# Patient Record
Sex: FEMALE | Race: WHITE | Employment: FULL TIME | URBAN - METROPOLITAN AREA
[De-identification: names, ages, dates, MRNs, and addresses within clinical notes are randomized per-mention and may not be internally consistent; named-entity substitution may affect disease eponyms.]

---

## 2018-08-21 PROBLEM — N39.0 RECURRENT UTI: Status: ACTIVE | Noted: 2018-08-21

## 2020-09-15 PROBLEM — N95.1 SYMPTOMATIC MENOPAUSAL OR FEMALE CLIMACTERIC STATES: Status: ACTIVE | Noted: 2020-09-15

## 2020-09-15 PROBLEM — I10 HIGH BLOOD PRESSURE: Status: ACTIVE | Noted: 2020-09-15

## 2020-09-15 PROBLEM — H69.80 DYSFUNCTION OF EUSTACHIAN TUBE: Status: ACTIVE | Noted: 2020-09-15

## 2020-09-15 PROBLEM — K21.9 ESOPHAGEAL REFLUX: Status: ACTIVE | Noted: 2020-09-15

## 2020-09-15 PROBLEM — E03.9 HYPOTHYROIDISM: Status: ACTIVE | Noted: 2020-09-15

## 2020-09-15 PROBLEM — J30.9 ALLERGIC RHINITIS: Status: ACTIVE | Noted: 2020-09-15

## 2020-09-15 PROBLEM — K58.9 IRRITABLE BOWEL SYNDROME: Status: ACTIVE | Noted: 2020-09-15

## 2021-01-21 ENCOUNTER — HOSPITAL ENCOUNTER (OUTPATIENT)
Dept: PHYSICAL THERAPY | Age: 61
Discharge: HOME | End: 2021-01-21
Payer: COMMERCIAL

## 2021-01-21 PROCEDURE — 97162 PT EVAL MOD COMPLEX 30 MIN: CPT

## 2021-01-21 PROCEDURE — 97110 THERAPEUTIC EXERCISES: CPT

## 2021-01-21 PROCEDURE — 97530 THERAPEUTIC ACTIVITIES: CPT

## 2021-01-21 NOTE — PROGRESS NOTES
[facility]  [address], [address] [facility] [place]  Ph [phone]  Fx [phone]    Plan of Care/ Statement of Necessity for Physical Therapy Services    Patient name: Paul Velarde Start of Care: 2021   Referral source: Zohra Li MD : 1960    Medical Diagnosis: Neck pain [M54.2]  Low back pain [M54.5]   Onset Date:chronic, progressive    Treatment Diagnosis: Neck pain M54.2; Low back pain M54.5                                               Prior Hospitalization: see medical history Provider#: [id number]   Medications: Verified on Patient summary List    Comorbidities: thyroid problems, asthma, HTN, cholecystectomy, elbow procedure, lymphedema of bilateral LEs; hysterectomy   Prior Level of Function: functionally independent, no AD, active lifestyle, right hand dominant      The Plan of Care and following information is based on the information from the initial evaluation. Assessment/ key information: 65 yo female who [facility] to [facility] with c/o neck and low back pain. Patient reports 6/10 neck and UE pain at best with finding good position and taking anti-inflammatories and use of ice and heat and bio freeze; 10/10 neck pain at worst with sleeping, typing at work; 7/10 low back pain at best with sitting; 10/10 low back pain at worst with any bending tendencies such as vacuuming, prolonged walking and standing, picking up things and moving things; pt reports they are unable to sleep through the night secondary to pain.  Patient demonstrates decreased ROM, decreased strength, impaired posture, pain and decreased functional mobility tolerance.   Patient will continue to benefit from skilled PT services to modify and progress therapeutic interventions, address functional mobility deficits, address ROM deficits, address strength deficits, analyze and address soft tissue restrictions, analyze and cue movement patterns, analyze and modify body mechanics/ergonomics and assess and modify postural abnormalities to attain remaining goals. Evaluation Complexity History MEDIUM  Complexity : 1-2 comorbidities / personal factors will impact the outcome/ POC ; Examination MEDIUM Complexity : 3 Standardized tests and measures addressing body structure, function, activity limitation and / or participation in recreation  ;Presentation MEDIUM Complexity : Evolving with changing characteristics  ; Clinical Decision Making MEDIUM Complexity : FOTO score of 26-74 : FOTO score = an established functional score where 100 = no disability  Overall Complexity Rating: MEDIUM  Problem List: pain affecting function, decrease ROM, decrease strength, edema affecting function, impaired gait/ balance, decrease ADL/ functional abilitiies, decrease activity tolerance, decrease flexibility/ joint mobility and decrease transfer abilities   Treatment Plan may include any combination of the following: Therapeutic exercise, Therapeutic activities, Neuromuscular re-education, Physical agent/modality, Gait/balance training, Manual therapy, Patient education, Self Care training, Functional mobility training, Home safety training and Stair training  Patient / Family readiness to learn indicated by: asking questions, trying to perform skills and interest  Persons(s) to be included in education: patient (P)  Barriers to Learning/Limitations: None  Measures taken if barriers to learning: NA  Patient Goal (s): I would like to get back to exercising like I used to and to relieve some of the pain. I want to be able to do everyday things that I used to be able to do without increased pain  Patient Self Reported Health Status: good  Rehabilitation Potential: good    Short Term Goals: To be accomplished in 2 weeks:  1. Patient will be independent and compliant with HEP to progress toward goals and restore functional mobility. Eval Status: issued at eval     2.  Patient will improve neck FOTO score by 10 points to improve functional tolerance for exercise. Eval Status: FOTO 48     3. Patient will improve pain in neck pain to 5/10 at worst to improve ADL tolerance and restore prior level of function. Eval Status: 10/10 at worst     4. Pt will have painfree cervical AROM to aid in functional mechanics for ambulation/ADLs. Eval Status:   Cervical Left Right Comments:pain, area   Flexion 12       Extension 15   P! Side bend 30 28 P! Rotation 35 40 P!                   Long Term Goals: To be accomplished in 6 weeks:  1. Patient will improve low back FOTO score by 12 points to improve functional tolerance for ADLs and exercise. Eval Status: FOTO 48     2. Pt will have painfree lumbar AROM to aid in functional mechanics for ambulation/ADLs. Eval Status:   Lumbar % AROM Comments:pain, area   Forward flexion 40-60 Fingertips to floor Stretch in HS   Extension 20-30 10% Pain in low back   SideBend right 20-30 53.5cm Pain on right side   SideBend left 20-30 53.5cm Pain on left side   Rotation right 5-10 10% Tight, restricted and increased pain on right   Rotation left 5-10 10% Tight, restricted and increased pain on left      3. Pt will have 5/5 bilateral UE and LE strength to return to goals of exercising and playing with grandkids without increased pain. Eval Status:   Shoulder L (0-5) R (0-5)   Shoulder Flexion 4+ 4+   Shoulder Extension 4+ 4+   Shoulder ABD 4+ 4+   Shoulder ADD       Shoulder IR 4+ 4+   Shoulder ER 4+ 4+                                             Cervical L (0-5) R (0-5)   Flexion 4-     Extension 4-     Side Bend 4- 4-   Rotation 4- 4-        L(0-5) R (0-5)   Hip Flexion (L1,2) 4 4   Knee Extension (L3,4) 4+ 4+   Ankle Dorsiflexion (L4) 4 4   Great Toe Extension (L5) 4+ 4+   Ankle Plantarflexion (S1) 4+ 4+   Knee Flexion (S1,2) 4 4   Abdominals 4-     Paraspinals 4-     Back Rotators 4- 4-   Gluteus Derrick 4- 4-   Hip Abduction 4- 4-      4.    Patient will improve pain in low back to 3/10 at worst to improve ADL tolerance and restore prior level of function. Eval Status: 10/10 at worst    Frequency / Duration: Patient to be seen 2 times per week for 6 weeks. Patient/ Caregiver education and instruction: Diagnosis, prognosis, self care, activity modification, brace/ splint application and exercises   [x]  Plan of care has been reviewed with PTA    Certification Period: FROILAN Yao, PT 1/21/2021 7:58 AM    ________________________________________________________________________    I certify that the above Therapy Services are being furnished while the patient is under my care. I agree with the treatment plan and certify that this therapy is necessary.     Physician's Signature:_____________________Date:____________TIME:________    [employer], Date and Time must be completed for valid certification **  Please sign and return to [facility]  [address], [facility]  Ph [phone]  Fx [phone]
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
## 2021-01-26 ENCOUNTER — HOSPITAL ENCOUNTER (OUTPATIENT)
Dept: PHYSICAL THERAPY | Age: 61
Discharge: HOME | End: 2021-01-26
Payer: COMMERCIAL

## 2021-01-26 PROCEDURE — 97140 MANUAL THERAPY 1/> REGIONS: CPT | Performed by: PHYSICAL THERAPIST

## 2021-01-26 PROCEDURE — 97110 THERAPEUTIC EXERCISES: CPT | Performed by: PHYSICAL THERAPIST

## 2021-01-26 NOTE — PROGRESS NOTES
PT DAILY TREATMENT NOTE    Patient Name: Tatum Marques  Date:2021  : 1960  [x]  Patient  Verified  Payor: Tosha Oiler / Plan: [place] [employer] PPO / Product Type: PPO /    In time:  Out time:162  Total Treatment Time (min):56  Total Timed Codes (min): 56    Visit #: 2 of 12    Treatment Area: Neck pain [M54.2]  Low back pain [M54.5]    SUBJECTIVE  Pain Level (0-10 scale): 8  Any medication changes, allergies to medications, adverse drug reactions, diagnosis change, or new procedure performed?: [x] No    [] Yes (see summary sheet for update)  Subjective functional status/changes:   [] No changes reported  Pt reports started HEP since last appt cervical retraction , open book     OBJECTIVE          41 min Therapeutic Exercise:  [x]? See flow sheet :   Rationale: increase ROM and increase strength to improve the patients ability to restore PLOF        15 min Manual Therapy:  Assisted ROM , supine retraction distraction extension mobilisation, MET , Mob with movement    Rationale: decrease pain, increase ROM and increase tissue extensibility to improve the patients functional mobility and ease of movement . The manual therapy interventions were performed at a separate and distinct time from the therapeutic activities interventions.             With   [] TE   [] TA   [] neuro   [] other: Patient Education: [x] Review HEP    [] Progressed/Changed HEP based on:   [] positioning   [] body mechanics   [] transfers   [] heat/ice application    [] other:      Other Objective/Functional Measures:   Cervical ROM : rotation right 38 deg , left 35 deg SB 22-23 deg bilateral , flexion 50% , extension 40 deg    Manual:   Assisted ROM , supine retraction distraction extension mobilisation, MET , Mob with movement      Pain Level (0-10 scale) post treatment: 8    ASSESSMENT/Changes in Function: tolerated ex well ,increase ROM in cervical with manual less pain in cervical thoracic junction but still pain high cervical tension and pulling , responded well to repeated mobility ex for UE trunk and LE ex improving ease and ROM . No adverse affect with therapy . Patient will continue to benefit from skilled PT services to modify and progress therapeutic interventions, address functional mobility deficits, address ROM deficits, address strength deficits, analyze and address soft tissue restrictions, analyze and cue movement patterns, analyze and modify body mechanics/ergonomics, assess and modify postural abnormalities and address imbalance/dizziness to attain remaining goals. [x]  See Plan of Care  []  See progress note/recertification  []  See Discharge Summary         Progress towards goals / Updated goals:  Short Term Goals: To be accomplished in 2 weeks:  1. Patient will be independent and compliant with HEP to progress toward goals and restore functional mobility. Eval Status: issued at [facility]  Current : issued another handout with new ex for repeated mobility ex .      2. Patient will improve neck FOTO score by 10 points to improve functional tolerance for exercise. Eval Status: FOTO 48     3. Patient will improve pain in neck pain to 5/10 at worst to improve ADL tolerance and restore prior level of function. Eval Status: 10/10 at worst  Current pain 8/10      4. Pt will have painfree cervical AROM to aid in functional mechanics for ambulation/ADLs. Eval Status:   Cervical Left Right Comments:pain, area   Flexion 12       Extension 15   P! Side bend 30 28 P! Rotation 35 40 P!                CUrrent : 1/26/21 rotation right 38 deg , left 35 deg SB 22-23 deg bilateral , flexion 50% , extension 40 deg       Long Term Goals: To be accomplished in 6 weeks:  1. Patient will improve low back FOTO score by 12 points to improve functional tolerance for ADLs and exercise. Eval Status: FOTO 48     2.   Pt will have painfree lumbar AROM to aid in functional mechanics for ambulation/ADLs.   Eval Status:   Lumbar % AROM Comments:pain, area   Forward flexion 40-60 Fingertips to floor Stretch in HS   Extension 20-30 10% Pain in low back   SideBend right 20-30 53.5cm Pain on right side   SideBend left 20-30 53.5cm Pain on left side   Rotation right 5-10 10% Tight, restricted and increased pain on right   Rotation left 5-10 10% Tight, restricted and increased pain on left      3.   Pt will have 5/5 bilateral UE and LE strength to return to goals of exercising and playing with grandkids without increased pain. Eval Status:   Shoulder L (0-5) R (0-5)   Shoulder Flexion 4+ 4+   Shoulder Extension 4+ 4+   Shoulder ABD 4+ 4+   Shoulder ADD       Shoulder IR 4+ 4+   Shoulder ER 4+ 4+                                             Cervical L (0-5) R (0-5)   Flexion 4-     Extension 4-     Side Bend 4- 4-   Rotation 4- 4-        L(0-5) R (0-5)   Hip Flexion (L1,2) 4 4   Knee Extension (L3,4) 4+ 4+   Ankle Dorsiflexion (L4) 4 4   Great Toe Extension (L5) 4+ 4+   Ankle Plantarflexion (S1) 4+ 4+   Knee Flexion (S1,2) 4 4   Abdominals 4-     Paraspinals 4-     Back Rotators 4- 4-   Gluteus Derrick 4- 4-   Hip Abduction 4- 4-      4.   Patient will improve pain in low back to 3/10 at worst to improve ADL tolerance and restore prior level of function.   Eval Status: 10/10 at worst      PLAN  [x]  Upgrade activities as tolerated     [x]  Continue plan of care  []  Update interventions per flow sheet       []  Discharge due to:_  []  Other:_      Maria Teresa Saleh, PT 1/26/2021  3:34 PM    Future Appointments   Date Time Provider Chris [place]   1/29/2021  3:30 PM Brianna العلي [facility]   2/2/2021  8:00 AM Nikhil Yao, PT [facility]   2/4/2021  8:00 AM Nikhil Yao PT [facility]   2/9/2021  8:00 AM Rani Becerra PTA [facility]   2/11/2021  8:00 AM Milka Jasmine [facility]   2/15/2021 10:15 AM Nikhil Yao, PT [facility]   2/18/2021  8:00 AM Rani Becerra PTA [facility]   2/23/2021  8:00 AM Rani Becerra PTA [facility]   2/25/2021  8:00 AM Elsa Ennis PT [facility]   3/15/2021  2:40 PM Kraig Baer[address]

## 2021-01-29 ENCOUNTER — HOSPITAL ENCOUNTER (OUTPATIENT)
Dept: PHYSICAL THERAPY | Age: 61
Discharge: HOME | End: 2021-01-29
Payer: COMMERCIAL

## 2021-01-29 PROCEDURE — 97140 MANUAL THERAPY 1/> REGIONS: CPT | Performed by: PHYSICAL THERAPIST

## 2021-01-29 PROCEDURE — 97110 THERAPEUTIC EXERCISES: CPT | Performed by: PHYSICAL THERAPIST

## 2021-01-29 NOTE — PROGRESS NOTES
PT DAILY TREATMENT NOTE    Patient Name: Suni Ma  Date:2021  : 1960  [x]  Patient  Verified  Payor: Allyn Montoya / Plan: [place] [employer] [place] / Product Type: PPO /    In time:1535 Out time:1640  Total Treatment Time (min): 65  Total Timed Codes (min): 65    Visit #: 3 of 12    Treatment Area: Neck pain [M54.2]  Low back pain [M54.5]    SUBJECTIVE  Pain Level (0-10 scale): 9/10 left base of skull and sx going up into left head, low back pain 9/10 left side , noticed mid morning left post thigh discomfort to knee . Currently no pain in thigh just left low back       Any medication changes, allergies to medications, adverse drug reactions, diagnosis change, or new procedure performed?: [x] No    [] Yes (see summary sheet for update)  Subjective functional status/changes:   [] No changes reported  Pt had one incident of rad sx down left post leg to knee not currently present , neck pain focus at base of skull C1-2     OBJECTIVE        50 min Therapeutic Exercise:  [x]? ? See flow sheet :   Rationale: increase ROM and increase strength to improve the patients ability to restore PLOF           15 min Manual Therapy:  PA mob C1-2 , MET for alignment/mobility High cervical taught HA mobs , assisted ROM , occipital release    Rationale: decrease pain, increase ROM and increase tissue extensibility to improve the patients functional mobility and ease of movement . The manual therapy interventions were performed at a separate and distinct time from the therapeutic activities interventions.             With   [] TE   [] TA   [] neuro   [] other: Patient Education: [x] Review HEP    [] Progressed/Changed HEP based on:   [] positioning   [] body mechanics   [] transfers   [] heat/ice application    [] other:      Other Objective/Functional Measures:   PA mob C1-2 , MET for alignment/mobility High cervical taught HA mobs , assisted ROM , occipital release      Cervical rotation limited today 19-20 deg bilateral, SB also 19 -20 deg , flexion 50% , extension 40 deg     Pain Level (0-10 scale) post treatment: 5     ASSESSMENT/Changes in Function: pt responded well to manual tech to lessen HA and pain base skull , mild improved cervical rotation, noted pt in standing mild shifted upper trunk to left side , responded well to standing glide ex to right to correct shift , less pain with glide and with pressups , however discomfort with standing extension . No adverse affect with therapy     Patient will continue to benefit from skilled PT services to modify and progress therapeutic interventions, address functional mobility deficits, address ROM deficits, address strength deficits, analyze and address soft tissue restrictions, analyze and cue movement patterns, analyze and modify body mechanics/ergonomics and assess and modify postural abnormalities to attain remaining goals. [x]  See Plan of Care  []  See progress note/recertification  []  See Discharge Summary         Progress towards goals / Updated goals:  Short Term Goals: To be accomplished in 2 weeks:  1. Patient will be independent and compliant with HEP to progress toward goals and restore functional mobility. Eval Status: issued at [facility]  Current : issued another handout with new ex for repeated mobility ex . clarified ex for weekend pressups , unilateral pressups , glides , cervical retraction , HA mobs      2. Patient will improve neck FOTO score by 10 points to improve functional tolerance for exercise. Eval Status: FOTO 48     3. Patient will improve pain in neck pain to 5/10 at worst to improve ADL tolerance and restore prior level of function. Eval Status: 10/10 at worst  Current pain 9/10 per pt decreased to 5 end of session      4. Pt will have painfree cervical AROM to aid in functional mechanics for ambulation/ADLs. Eval Status:   Cervical Left Right Comments:pain, area   Flexion 12       Extension 15   P! Side bend 30 28 P!    Rotation 35 40 P!                CUrrent : 1/26/21 rotation right 38 deg , left 35 deg SB 22-23 deg bilateral , flexion 50% , extension 40 deg  1/29/2021 more limited rotation today 19-20 deg bilaterally         Long Term Goals: To be accomplished in 6 weeks:  1. Patient will improve low back FOTO score by 12 points to improve functional tolerance for ADLs and exercise. Eval Status: FOTO 48     2.   Pt will have painfree lumbar AROM to aid in functional mechanics for ambulation/ADLs. Eval Status:   Lumbar % AROM Comments:pain, area   Forward flexion 40-60 Fingertips to floor Stretch in HS   Extension 20-30 10% Pain in low back   SideBend right 20-30 53.5cm Pain on right side   SideBend left 20-30 53.5cm Pain on left side   Rotation right 5-10 10% Tight, restricted and increased pain on right   Rotation left 5-10 10% Tight, restricted and increased pain on left      3.   Pt will have 5/5 bilateral UE and LE strength to return to goals of exercising and playing with grandkids without increased pain. Eval Status:   Shoulder L (0-5) R (0-5)   Shoulder Flexion 4+ 4+   Shoulder Extension 4+ 4+   Shoulder ABD 4+ 4+   Shoulder ADD       Shoulder IR 4+ 4+   Shoulder ER 4+ 4+                                             Cervical L (0-5) R (0-5)   Flexion 4-     Extension 4-     Side Bend 4- 4-   Rotation 4- 4-        L(0-5) R (0-5)   Hip Flexion (L1,2) 4 4   Knee Extension (L3,4) 4+ 4+   Ankle Dorsiflexion (L4) 4 4   Great Toe Extension (L5) 4+ 4+   Ankle Plantarflexion (S1) 4+ 4+   Knee Flexion (S1,2) 4 4   Abdominals 4-     Paraspinals 4-     Back Rotators 4- 4-   Gluteus Derrick 4- 4-   Hip Abduction 4- 4-      4.   Patient will improve pain in low back to 3/10 at worst to improve ADL tolerance and restore prior level of function.   Eval Status: 10/10 at worst         PLAN  [x]  Upgrade activities as tolerated     [x]  Continue plan of care  []  Update interventions per flow sheet       []  Discharge due to:_  []  Other:_      Alondra Allen PT 1/29/2021  3:57 PM    Future Appointments   Date Time Provider Chris [place]mine   2/2/2021  2:00 PM Alverto Tapia [facility]   2/4/2021  2:00 PM Gali Yao, [facility]   2/9/2021  3:30 PM [facility]   2/11/2021  3:30 PM [facility]   2/15/2021 10:15 AM Gali Yao, [facility]   2/18/2021  8:00 AM Lucita Pizano [facility]   2/23/2021  2:00 PM [facility]   2/25/2021  3:30 PM [facility]   3/15/2021  2:40 PM Negrito Licea[address]

## 2021-02-02 ENCOUNTER — HOSPITAL ENCOUNTER (OUTPATIENT)
Dept: PHYSICAL THERAPY | Age: 61
Discharge: HOME | End: 2021-02-02
Payer: COMMERCIAL

## 2021-02-02 PROCEDURE — 97140 MANUAL THERAPY 1/> REGIONS: CPT | Performed by: PHYSICAL THERAPIST

## 2021-02-02 PROCEDURE — 97110 THERAPEUTIC EXERCISES: CPT | Performed by: PHYSICAL THERAPIST

## 2021-02-02 NOTE — PROGRESS NOTES
PT DAILY TREATMENT NOTE    Patient Name: Suni Ma  [id number]  : 1960  [x]  Patient  Verified  Payor: Allyn Montoya / Plan: [place] [employer] [place] / Product Type: PPO /    In time:1400  Out time:1456  Total Treatment Time (min): 56  Total Timed Codes (min): 56  1:1 Treatment Time ( only): 64   Visit #: 4 of 12    Treatment Area: Neck pain [M54.2]  Low back pain [M54.5]    SUBJECTIVE  Pain Level (0-10 scale): 6 high cervical and low back   Any medication changes, allergies to medications, adverse drug reactions, diagnosis change, or new procedure performed?: [x] No    [] Yes (see summary sheet for update)  Subjective functional status/changes:   [] No changes reported  Sore Saturday am , just stayed sore this weekend  More in neck , but low back no leg symptoms when she left after appt Friday and has not returned just pain in back no rad sx . OBJECTIVE        46 min Therapeutic Exercise:  [x]? ?? See flow sheet :   Rationale: increase ROM and increase strength to improve the patients ability to restore PLOF           10 min Manual Therapy:  occipital release , assisted ROM , stretch SLR    Rationale: decrease pain, increase ROM and increase tissue extensibility to improve the patients functional mobility and ease of movement .   The manual therapy interventions were performed at a separate and distinct time from the therapeutic activities interventions.           With   [] TE   [] TA   [] neuro   [] other: Patient Education: [x] Review HEP    [] Progressed/Changed HEP based on:   [] positioning   [] body mechanics   [] transfers   [] heat/ice application    [] other:      Other Objective/Functional Measures:   Pelvic alignment : = ASIS , = leg , right up PSIS , quad flexibility only mild tight L>R added stretch and mild right hip flexor tight     pressup 50% today initial but increase to 75%+ end of session  , sitting rotation tight 40 deg each way ( increase to 55 deg right , 43 deg left following UE repeated flexion ex )     UE flexion 165 left , 180 deg right following 3x 10 reps right left now 175-180 deg     abd WNL approx 175 deg bilaterally     Cervical rotation 37 deg right ( 45 end of session )  , 42 deg left ( 48 end of session )   24 sidebend right ( increase to 32 deg ) , 22 right SB ( increase to 27 )    extension 40 to 44 deg      Pain Level (0-10 scale) post treatment: 6 same     ASSESSMENT/Changes in Function: pt low back progressing in less pain no rad sx and increase mobility. Pt high cervical pain persisting responding slowly , note some improvement in ROM with cervical from beginning to end of session but pain is staying high. No adverse affect with therapy . Pt tolerated added resistive ex today. Patient will continue to benefit from skilled PT services to modify and progress therapeutic interventions, address functional mobility deficits, address ROM deficits, address strength deficits, analyze and address soft tissue restrictions, analyze and cue movement patterns, analyze and modify body mechanics/ergonomics and assess and modify postural abnormalities to attain remaining goals. [x]  See Plan of Care  []  See progress note/recertification  []  See Discharge Summary         Progress towards goals / Updated goals:  Short Term Goals: To be accomplished in 2 weeks:  1. Patient will be independent and compliant with HEP to progress toward goals and restore functional mobility. Eval Status: issued at eval  Current : 2/2/2021 reviewed ex giving cues for posture and positioning .      2. Patient will improve neck FOTO score by 10 points to improve functional tolerance for exercise. Eval Status: FOTO 48     3. Patient will improve pain in neck pain to 5/10 at worst to improve ADL tolerance and restore prior level of function. Eval Status: 10/10 at worst  Current pain staying 6 today 2/2/2021     4.  Pt will have painfree cervical AROM to aid in functional mechanics for ambulation/ADLs. Eval Status:   Cervical Left Right Comments:pain, area   Flexion 12       Extension 15   P! Side bend 30 28 P! Rotation 35 40 P!                 1/26/21 rotation right 38 deg , left 35 deg SB 22-23 deg bilateral , flexion 50% , extension 40 deg  1/29/2021 more limited rotation today 19-20 deg bilaterally     2/2/2021 Cervical rotation 37 deg right ( 45 end of session )  , 42 deg left ( 48 end of session )   24 sidebend right ( increase to 32 deg ) , 22 right SB ( increase to 27 )    extension 40 to 44 deg         Long Term Goals: To be accomplished in 6 weeks:  1. Patient will improve low back FOTO score by 12 points to improve functional tolerance for ADLs and exercise. Eval Status: FOTO 48     2.   Pt will have painfree lumbar AROM to aid in functional mechanics for ambulation/ADLs. Eval Status:   Lumbar % AROM Comments:pain, area   Forward flexion 40-60 Fingertips to floor Stretch in HS   Extension 20-30 10% Pain in low back   SideBend right 20-30 53.5cm Pain on right side   SideBend left 20-30 53.5cm Pain on left side   Rotation right 5-10 10% Tight, restricted and increased pain on right   Rotation left 5-10 10% Tight, restricted and increased pain on left      3.   Pt will have 5/5 bilateral UE and LE strength to return to goals of exercising and playing with grandkids without increased pain.   Eval Status:   Shoulder L (0-5) R (0-5)   Shoulder Flexion 4+ 4+   Shoulder Extension 4+ 4+   Shoulder ABD 4+ 4+   Shoulder ADD       Shoulder IR 4+ 4+   Shoulder ER 4+ 4+                                             Cervical L (0-5) R (0-5)   Flexion 4-     Extension 4-     Side Bend 4- 4-   Rotation 4- 4-        L(0-5) R (0-5)   Hip Flexion (L1,2) 4 4   Knee Extension (L3,4) 4+ 4+   Ankle Dorsiflexion (L4) 4 4   Great Toe Extension (L5) 4+ 4+   Ankle Plantarflexion (S1) 4+ 4+   Knee Flexion (S1,2) 4 4   Abdominals 4-     Paraspinals 4-     Back Rotators 4- 4-   Gluteus Derrick 4- 4-   Hip Abduction 4- 4-      4.   Patient will improve pain in low back to 3/10 at worst to improve ADL tolerance and restore prior level of function.   Eval Status: 10/10 at worst         PLAN  [x]  Upgrade activities as tolerated     [x]  Continue plan of care  []  Update interventions per flow sheet       []  Discharge due to:_  []  Other:_      Ondina Camejo, PT 2/2/2021  2:25 PM    Future Appointments   Date Time Provider Chris Dover   2/4/2021  2:00 PM Annie Perales, [facility]   2/9/2021  3:30 PM [facility]   2/11/2021  3:30 PM [facility]   2/15/2021 10:15 AM Juany Yao [facility]   2/18/2021  8:00 AM Mulu Collins [facility]   2/23/2021  2:00 PM Yeyo [facility]   2/25/2021  3:30 PM [facility]   3/15/2021  2:40 PM Kulwinder Gold, [address] [place] [address]

## 2021-02-04 ENCOUNTER — HOSPITAL ENCOUNTER (OUTPATIENT)
Dept: PHYSICAL THERAPY | Age: 61
Discharge: HOME | End: 2021-02-04
Payer: COMMERCIAL

## 2021-02-04 PROCEDURE — 97140 MANUAL THERAPY 1/> REGIONS: CPT

## 2021-02-04 PROCEDURE — 97110 THERAPEUTIC EXERCISES: CPT

## 2021-02-04 NOTE — PROGRESS NOTES
PT DAILY TREATMENT NOTE    Patient Name: Angelica Landers  LBXQ:[id number]  : 1960  [x]  Patient  Verified  Payor: [facility] / Plan: [place] [employer] PPO / Product Type: PPO /    In time:1400  Out time:1445  Total Treatment Time (min): 45  Total Timed Codes (min): 45  1:1 Treatment Time ( only): 45   Visit #: 5 of 12    Treatment Area: Neck pain [M54.2]  Low back pain [M54.5]    SUBJECTIVE  Pain Level (0-10 scale): 7/10 neck and back pain  Any medication changes, allergies to medications, adverse drug reactions, diagnosis change, or new procedure performed?: [x] No    [] Yes (see summary sheet for update)  Subjective functional status/changes:   [] No changes reported  Patient reports no new changes. She does report that she has not had any change in her pain since the [facility]. OBJECTIVE    30 min Therapeutic Exercise:  [] See flow sheet :   Rationale: increase ROM and increase strength to improve the patients ability to restore PLOF    15 min Manual Therapy:  SOR, MFR And TPR to bilateral UT and LS   Rationale: decrease pain, increase ROM and increase tissue extensibility to improve the patients ability to restore PLOF  The manual therapy interventions were performed at a separate and distinct time from the therapeutic activities interventions. With   [x] TE   [] TA   [] neuro   [] other: Patient Education: [x] Review HEP    [] Progressed/Changed HEP based on:   [] positioning   [] body mechanics   [] transfers   [] heat/ice application    [] other:      Other Objective/Functional Measures: NA     Pain Level (0-10 scale) post treatment: 4/10    ASSESSMENT/Changes in Function: Patient tolerated treatment session well today. No complaints with added core stability exercises.     Patient will continue to benefit from skilled PT services to modify and progress therapeutic interventions, address functional mobility deficits, address ROM deficits, address strength deficits, analyze and address soft tissue restrictions, analyze and cue movement patterns, analyze and modify body mechanics/ergonomics and assess and modify postural abnormalities to attain remaining goals. [x]  See Plan of Care  []  See progress note/recertification  []  See Discharge Summary         Progress towards goals / Updated goals:  Short Term Goals: To be accomplished in 2 weeks:  1. Patient will be independent and compliant with HEP to progress toward goals and restore functional mobility. Eval Status: issued at eval  Current : 2/2/2021 reviewed ex giving cues for posture and positioning .      2. Patient will improve neck FOTO score by 10 points to improve functional tolerance for exercise. Eval Status: FOTO 48     3. Patient will improve pain in neck pain to 5/10 at worst to improve ADL tolerance and restore prior level of function. Eval Status: 10/10 at worst  Current: 4-7/10 today 2/4/21     4. Pt will have painfree cervical AROM to aid in functional mechanics for ambulation/ADLs. Eval Status:   Cervical Left Right Comments:pain, area   Flexion 12       Extension 15   P! Side bend 30 28 P! Rotation 35 40 P!                 1/26/21 rotation right 38 deg , left 35 deg SB 22-23 deg bilateral , flexion 50% , extension 40 deg  1/29/2021 more limited rotation today 19-20 deg bilaterally      2/2/2021 Cervical rotation 37 deg right ( 45 end of session )  , 42 deg left ( 48 end of session )   24 sidebend right ( increase to 32 deg ) , 22 right SB ( increase to 27 )    extension 40 to 44 deg         Long Term Goals: To be accomplished in 6 weeks:  1. Patient will improve low back FOTO score by 12 points to improve functional tolerance for ADLs and exercise. Eval Status: FOTO 48     2.   Pt will have painfree lumbar AROM to aid in functional mechanics for ambulation/ADLs.   Eval Status:   Lumbar % AROM Comments:pain, area   Forward flexion 40-60 Fingertips to floor Stretch in HS   Extension 20-30 10% Pain in low back   SideBend right 20-30 53.5cm Pain on right side   SideBend left 20-30 53.5cm Pain on left side   Rotation right 5-10 10% Tight, restricted and increased pain on right   Rotation left 5-10 10% Tight, restricted and increased pain on left      3.   Pt will have 5/5 bilateral UE and LE strength to return to goals of exercising and playing with grandkids without increased pain. Eval Status:   Shoulder L (0-5) R (0-5)   Shoulder Flexion 4+ 4+   Shoulder Extension 4+ 4+   Shoulder ABD 4+ 4+   Shoulder ADD       Shoulder IR 4+ 4+   Shoulder ER 4+ 4+                                             Cervical L (0-5) R (0-5)   Flexion 4-     Extension 4-     Side Bend 4- 4-   Rotation 4- 4-        L(0-5) R (0-5)   Hip Flexion (L1,2) 4 4   Knee Extension (L3,4) 4+ 4+   Ankle Dorsiflexion (L4) 4 4   Great Toe Extension (L5) 4+ 4+   Ankle Plantarflexion (S1) 4+ 4+   Knee Flexion (S1,2) 4 4   Abdominals 4-     Paraspinals 4-     Back Rotators 4- 4-   Gluteus Derrick 4- 4-   Hip Abduction 4- 4-      4.   Patient will improve pain in low back to 3/10 at worst to improve ADL tolerance and restore prior level of function.   Eval Status: 10/10 at worst         PLAN  []  Upgrade activities as tolerated     []  Continue plan of care  []  Update interventions per flow sheet       []  Discharge due to:_  []  Other:_      Abelardo Ybarra, PT 2/4/2021  2:17 PM    Future Appointments   Date Time Provider Chris [place]   2/9/2021  3:30 PM [facility]   2/11/2021  3:30 PM [facility]   2/15/2021 10:15 AM Ashley Yao, [facility]   2/18/2021  8:00 AM Susie Vaughan PT [facility]   2/23/2021  2:00 PM Michael Steiner [facility]   2/25/2021  3:30 PM [facility]   3/15/2021  2:40 PM Evin Arora, [address] [place] [address]

## 2021-02-08 ENCOUNTER — HOSPITAL ENCOUNTER (OUTPATIENT)
Dept: PHYSICAL THERAPY | Age: 61
Discharge: HOME | End: 2021-02-08
Payer: COMMERCIAL

## 2021-02-08 PROCEDURE — 97140 MANUAL THERAPY 1/> REGIONS: CPT

## 2021-02-08 PROCEDURE — 97110 THERAPEUTIC EXERCISES: CPT

## 2021-02-08 NOTE — PROGRESS NOTES
PT DAILY TREATMENT NOTE    Patient Name: Abel Gómez  Date:2021  : 1960  [x]  Patient  Verified  Payor: Vena Duane / Plan: [place] [employer] [place] / Product Type: PPO /    In time:161  Out time:170  Total Treatment Time (min): 45  Total Timed Codes (min): 45  1:1 Treatment Time ( [address] only): 45   Visit #: 6 of 12    Treatment Area: Neck pain [M54.2]  Low back pain [M54.5]    SUBJECTIVE  Pain Level (0-10 scale): 7/10 neck and back  Any medication changes, allergies to medications, adverse drug reactions, diagnosis change, or new procedure performed?: [x] No    [] Yes (see summary sheet for update)  Subjective functional status/changes:   [] No changes reported  Patient pleasant, reports she felt like she had improved ROM following last visit and had carryover through Saturday night. OBJECTIVE    25 min Therapeutic Exercise:  [] See flow sheet :   Rationale: increase ROM and increase strength to improve the patients ability to restore PLOF    20 min Manual Therapy:  SOR, MFR And TPR to bilateral UT and LS   Rationale: decrease pain, increase ROM and increase tissue extensibility to improve the patients ability to restore PLOF  The manual therapy interventions were performed at a separate and distinct time from the therapeutic activities interventions. With   [x] TE   [] TA   [] neuro   [] other: Patient Education: [x] Review HEP    [] Progressed/Changed HEP based on:   [] positioning   [] body mechanics   [] transfers   [] heat/ice application    [] other:      Other Objective/Functional Measures: NA     Pain Level (0-10 scale) post treatment: 5/10    ASSESSMENT/Changes in Function: Pt tolerated treatment session well today. Noted decreased muscle tension with TPR to right UT today, however left UT still largely stiff and tight causing restricted ROM on right side. No complaints or adverse reactions noted with session today.     Patient will continue to benefit from skilled PT services to modify and progress therapeutic interventions, address functional mobility deficits, address ROM deficits, address strength deficits, analyze and address soft tissue restrictions, analyze and cue movement patterns, analyze and modify body mechanics/ergonomics and assess and modify postural abnormalities to attain remaining goals. [x]  See Plan of Care  []  See progress note/recertification  []  See Discharge Summary         Progress towards goals / Updated goals:  Short Term Goals: To be accomplished in 2 weeks:  1. Patient will be independent and compliant with HEP to progress toward goals and restore functional mobility. Eval Status: issued at eval  Current : 2/2/2021 reviewed ex giving cues for posture and positioning .      2. Patient will improve neck FOTO score by 10 points to improve functional tolerance for exercise. Eval Status: FOTO 48     3. Patient will improve pain in neck pain to 5/10 at worst to improve ADL tolerance and restore prior level of function. Eval Status: 10/10 at worst  Current: 4-7/10 today 2/4/21     4. Pt will have painfree cervical AROM to aid in functional mechanics for ambulation/ADLs. Eval Status:   Cervical Left Right Comments:pain, area   Flexion 12       Extension 15   P! Side bend 30 28 P! Rotation 35 40 P!                 1/26/21 rotation right 38 deg , left 35 deg SB 22-23 deg bilateral , flexion 50% , extension 40 deg  1/29/2021 more limited rotation today 19-20 deg bilaterally      2/2/2021 Cervical rotation 37 deg right ( 45 end of session )  , 42 deg left ( 48 end of session )   24 sidebend right ( increase to 32 deg ) , 22 right SB ( increase to 27 )    extension 40 to 44 deg         Long Term Goals: To be accomplished in 6 weeks:  1. Patient will improve low back FOTO score by 12 points to improve functional tolerance for ADLs and exercise. Eval Status: FOTO 48     2.   Pt will have painfree lumbar AROM to aid in functional mechanics for ambulation/ADLs.   Eval Status:   Lumbar % AROM Comments:pain, area   Forward flexion 40-60 Fingertips to floor Stretch in HS   Extension 20-30 10% Pain in low back   SideBend right 20-30 53.5cm Pain on right side   SideBend left 20-30 53.5cm Pain on left side   Rotation right 5-10 10% Tight, restricted and increased pain on right   Rotation left 5-10 10% Tight, restricted and increased pain on left      3.   Pt will have 5/5 bilateral UE and LE strength to return to goals of exercising and playing with grandkids without increased pain. Eval Status:   Shoulder L (0-5) R (0-5)   Shoulder Flexion 4+ 4+   Shoulder Extension 4+ 4+   Shoulder ABD 4+ 4+   Shoulder ADD       Shoulder IR 4+ 4+   Shoulder ER 4+ 4+                                             Cervical L (0-5) R (0-5)   Flexion 4-     Extension 4-     Side Bend 4- 4-   Rotation 4- 4-        L(0-5) R (0-5)   Hip Flexion (L1,2) 4 4   Knee Extension (L3,4) 4+ 4+   Ankle Dorsiflexion (L4) 4 4   Great Toe Extension (L5) 4+ 4+   Ankle Plantarflexion (S1) 4+ 4+   Knee Flexion (S1,2) 4 4   Abdominals 4-     Paraspinals 4-     Back Rotators 4- 4-   Gluteus Derrick 4- 4-   Hip Abduction 4- 4-      4.   Patient will improve pain in low back to 3/10 at worst to improve ADL tolerance and restore prior level of function.   Eval Status: 10/10 at worst  Current: 5-7/10 today 2/8/21         PLAN  []  Upgrade activities as tolerated     []  Continue plan of care  []  Update interventions per flow sheet       []  Discharge due to:_  []  Other:_      Aneesh Islas, PT 2/8/2021  4:23 PM    Future Appointments   Date Time Provider Chris Dover   2/10/2021  4:15 PM Alise Ryan [facility]   2/15/2021 10:15 AM Briana Yao, PT [facility]   2/18/2021  8:45 AM Briana Yao PT [facility]   2/22/2021  4:15 PM Alise Ryan [facility]   2/24/2021  4:15 PM Edinson Cook PT [facility]   3/15/2021  2:40 PM Clement Dougherty, [address] [place] [address]

## 2021-02-09 ENCOUNTER — APPOINTMENT (OUTPATIENT)
Dept: PHYSICAL THERAPY | Age: 61
End: 2021-02-09
Payer: COMMERCIAL

## 2021-02-10 ENCOUNTER — HOSPITAL ENCOUNTER (OUTPATIENT)
Dept: PHYSICAL THERAPY | Age: 61
Discharge: HOME | End: 2021-02-10
Payer: COMMERCIAL

## 2021-02-10 PROCEDURE — 97110 THERAPEUTIC EXERCISES: CPT

## 2021-02-10 PROCEDURE — 97140 MANUAL THERAPY 1/> REGIONS: CPT

## 2021-02-10 PROCEDURE — 97530 THERAPEUTIC ACTIVITIES: CPT

## 2021-02-10 NOTE — PROGRESS NOTES
PT DAILY TREATMENT NOTE    Patient Name: Nona Ganser  Date:2/10/2021  : 1960  [x]  Patient  Verified  Payor: Tatianna Moore / [place]: [place] [employer] [place] / Product Type: PPO /    In time:1610  Out time:1700  Total Treatment Time (min): 50  Total Timed Codes (min): 50  1:1 Treatment Time ( only): 50   Visit #: 7 of 12    Treatment Area: Neck pain [M54.2]  Low back pain [M54.5]    SUBJECTIVE  Pain Level (0-10 scale): 5/10 neck, 4/10 low back  Any medication changes, allergies to medications, adverse drug reactions, diagnosis change, or new procedure performed?: [x] No    [] Yes (see summary sheet for update)  Subjective functional status/changes:   [] No changes reported  Patient pleasant, reports no new changes. OBJECTIVE  25 min Therapeutic Exercise:  [] See flow sheet :   Rationale: increase ROM and increase strength to improve the patients ability to restore PLOF    25 min Manual Therapy:  SOR, MFR And TPR to bilateral UT and LS and rhomboids   Rationale: decrease pain, increase ROM and increase tissue extensibility to improve the patients ability to restore PLOF  The manual therapy interventions were performed at a separate and distinct time from the therapeutic activities interventions. With   [x] TE   [] TA   [] neuro   [] other: Patient Education: [x] Review HEP    [] Progressed/Changed HEP based on:   [] positioning   [] body mechanics   [] transfers   [] heat/ice application    [] other:      Other Objective/Functional Measures: NA     Pain Level (0-10 scale) post treatment: 0    ASSESSMENT/Changes in Function: Patient tolerated treatment session well today. She continues to make good progress toward goals. No adverse reaction with session today.     Patient will continue to benefit from skilled PT services to modify and progress therapeutic interventions, address functional mobility deficits, address ROM deficits, address strength deficits, analyze and address soft tissue restrictions, analyze and cue movement patterns, analyze and modify body mechanics/ergonomics and assess and modify postural abnormalities to attain remaining goals. [x]  See Plan of Care  []  See progress note/recertification  []  See Discharge Summary         Progress towards goals / Updated goals:  Short Term Goals: To be accomplished in 2 weeks:  1. Patient will be independent and compliant with HEP to progress toward goals and restore functional mobility. Eval Status: issued at eval  Current : 2/2/2021 reviewed ex giving cues for posture and positioning .      2. Patient will improve neck FOTO score by 10 points to improve functional tolerance for exercise. Eval Status: FOTO 48     3. Patient will improve pain in neck pain to 5/10 at worst to improve ADL tolerance and restore prior level of function. Eval Status: 10/10 at worst  Current: 3-5/10 today improving 2/10/21     4. Pt will have painfree cervical AROM to aid in functional mechanics for ambulation/ADLs. Eval Status:   Cervical Left Right Comments:pain, area   Flexion 12       Extension 15   P! Side bend 30 28 P! Rotation 35 40 P!                 1/26/21 rotation right 38 deg , left 35 deg SB 22-23 deg bilateral , flexion 50% , extension 40 deg  1/29/2021 more limited rotation today 19-20 deg bilaterally      2/2/2021 Cervical rotation 37 deg right ( 45 end of session )  , 42 deg left ( 48 end of session )   24 sidebend right ( increase to 32 deg ) , 22 right SB ( increase to 27 )    extension 40 to 44 deg         Long Term Goals: To be accomplished in 6 weeks:  1. Patient will improve low back FOTO score by 12 points to improve functional tolerance for ADLs and exercise. Eval Status: FOTO 48     2.   Pt will have painfree lumbar AROM to aid in functional mechanics for ambulation/ADLs.   Eval Status:   Lumbar % AROM Comments:pain, area   Forward flexion 40-60 Fingertips to floor Stretch in HS   Extension 20-30 10% Pain in low back   SideBend right 20-30 53.5cm Pain on right side   SideBend left 20-30 53.5cm Pain on left side   Rotation right 5-10 10% Tight, restricted and increased pain on right   Rotation left 5-10 10% Tight, restricted and increased pain on left      3.   Pt will have 5/5 bilateral UE and LE strength to return to goals of exercising and playing with grandkids without increased pain. Eval Status:   Shoulder L (0-5) R (0-5)   Shoulder Flexion 4+ 4+   Shoulder Extension 4+ 4+   Shoulder ABD 4+ 4+   Shoulder ADD       Shoulder IR 4+ 4+   Shoulder ER 4+ 4+                                             Cervical L (0-5) R (0-5)   Flexion 4-     Extension 4-     Side Bend 4- 4-   Rotation 4- 4-        L(0-5) R (0-5)   Hip Flexion (L1,2) 4 4   Knee Extension (L3,4) 4+ 4+   Ankle Dorsiflexion (L4) 4 4   Great Toe Extension (L5) 4+ 4+   Ankle Plantarflexion (S1) 4+ 4+   Knee Flexion (S1,2) 4 4   Abdominals 4-     Paraspinals 4-     Back Rotators 4- 4-   Gluteus Derrick 4- 4-   Hip Abduction 4- 4-      4.   Patient will improve pain in low back to 3/10 at worst to improve ADL tolerance and restore prior level of function.   Eval Status: 10/10 at worst  Current: 5-7/10 today 2/8/21      PLAN  [x]  Upgrade activities as tolerated     [x]  Continue plan of care  []  Update interventions per flow sheet       []  Discharge due to:_  []  Other:_      Ferd Brittle, PT 2/10/2021  4:22 PM    Future Appointments   Date Time Provider Chris Dover   2/15/2021 10:15 AM Brent Kearney [facility]   2/18/2021  8:45 AM Crystal Yao [facility]   2/22/2021  4:15 PM Crystal Yao [facility]   2/24/2021  4:15 PM Brent Kearney [facility]   3/15/2021  2:40 PM Keven Fuentes[address]

## 2021-02-11 ENCOUNTER — APPOINTMENT (OUTPATIENT)
Dept: PHYSICAL THERAPY | Age: 61
End: 2021-02-11
Payer: COMMERCIAL

## 2021-02-15 ENCOUNTER — HOSPITAL ENCOUNTER (OUTPATIENT)
Dept: PHYSICAL THERAPY | Age: 61
Discharge: HOME | End: 2021-02-15
Payer: COMMERCIAL

## 2021-02-15 PROCEDURE — 97140 MANUAL THERAPY 1/> REGIONS: CPT

## 2021-02-15 PROCEDURE — 97110 THERAPEUTIC EXERCISES: CPT

## 2021-02-15 NOTE — PROGRESS NOTES
PT DAILY TREATMENT NOTE    Patient Name: Arlette Gustafson  Date:2/15/2021  : 1960  [x]  Patient  Verified  Payor: Patricia Neff / [place]: [place] [employer] [place] / Product Type: PPO /    In time:1015  Out time:1110  Total Treatment Time (min): 55  Total Timed Codes (min): 55  1:1 Treatment Time ( only): 54   Visit #: 8 of 12    Treatment Area: Neck pain [M54.2]  Low back pain [M54.5]    SUBJECTIVE  Pain Level (0-10 scale): 6/10 neck and back pain  Any medication changes, allergies to medications, adverse drug reactions, diagnosis change, or new procedure performed?: [x] No    [] Yes (see summary sheet for update)  Subjective functional status/changes:   [] No changes reported  Pt pleasant, reports no new changes. She reports she thinks the pain is higher bc of the lousy weather. OBJECTIVE    30 min Therapeutic Exercise:  [] See flow sheet :   Rationale: increase ROM and increase strength to improve the patients ability to restore PLOF    25 min Manual Therapy:  SOR, MFR And TPR to bilateral UT and LS and rhomboids   Rationale: decrease pain, increase ROM and increase tissue extensibility to improve the patients ability to restore PLOF  The manual therapy interventions were performed at a separate and distinct time from the therapeutic activities interventions. With   [x] TE   [] TA   [] neuro   [] other: Patient Education: [x] Review HEP    [] Progressed/Changed HEP based on:   [] positioning   [] body mechanics   [] transfers   [] heat/ice application    [] other:      Other Objective/Functional Measures: improved ROM, improved pain levels, no clinically important change in strength     Pain Level (0-10 scale) post treatment: 5/10 neck and pain     ASSESSMENT/Changes in Function: Pt tolerated treatment session well today. No complaints with added therex for core stability and strength today. She is making slow progress toward goals.  She would benefit from continued skilled PT intervention to address remaining goals. Patient will continue to benefit from skilled PT services to modify and progress therapeutic interventions, address functional mobility deficits, address ROM deficits, address strength deficits, analyze and address soft tissue restrictions, analyze and cue movement patterns, analyze and modify body mechanics/ergonomics and assess and modify postural abnormalities to attain remaining goals. [x]  See Plan of Care  []  See progress note/recertification  []  See Discharge Summary         Progress towards goals / Updated goals:  Short Term Goals: To be accomplished in 2 weeks:  1. Patient will be independent and compliant with HEP to progress toward goals and restore functional mobility. Eval Status: issued at eval  Current : 2/15/2021 reviewed ex giving cues for posture and positioning .      2. Patient will improve neck FOTO score by 10 points to improve functional tolerance for exercise. Eval Status: FOTO 48  Current: FOTO 52, progressing 2/15/21     3. Patient will improve pain in neck pain to 5/10 at worst to improve ADL tolerance and restore prior level of function. Eval Status: 10/10 at worst  Current: 5-6/10 today improving 2/15/21     4. Pt will have painfree cervical AROM to aid in functional mechanics for ambulation/ADLs. Eval Status:   Cervical Left Right Comments:pain, area   Flexion 12       Extension 15   P! Side bend 30 28 P! Rotation 35 40 P!                Current: 2/15/2021 Cervical rotation 40 deg right  , 45 deg left  30 sidebend right  , 30 right SB    extension 35deg  Flexion 30 deg          Long Term Goals: To be accomplished in 6 weeks:  1. Patient will improve low back FOTO score by 12 points to improve functional tolerance for ADLs and exercise.   Eval Status: FOTO 48  Current: FOTO 39, regression although she reports functional improvement and decreased low back pain 2/15/21     2.   Pt will have painfree lumbar AROM to aid in functional mechanics for ambulation/ADLs. Eval Status:   Lumbar % AROM Comments:pain, area   Forward flexion 40-60 Fingertips to floor Stretch in HS   Extension 20-30 10% Pain in low back   SideBend right 20-30 53.5cm Pain on right side   SideBend left 20-30 53.5cm Pain on left side   Rotation right 5-10 10% Tight, restricted and increased pain on right   Rotation left 5-10 10% Tight, restricted and increased pain on left    Current: no significant change except for rotation improved to 25% bilaterally without increased pain 2/15/21    3.   Pt will have 5/5 bilateral UE and LE strength to return to goals of exercising and playing with grandkids without increased pain. Eval Status:   Shoulder L (0-5) R (0-5)   Shoulder Flexion 4+ 4+   Shoulder Extension 4+ 4+   Shoulder ABD 4+ 4+   Shoulder ADD       Shoulder IR 4+ 4+   Shoulder ER 4+ 4+                                             Cervical L (0-5) R (0-5)   Flexion 4-     Extension 4-     Side Bend 4- 4-   Rotation 4- 4-        L(0-5) R (0-5)   Hip Flexion (L1,2) 4 4   Knee Extension (L3,4) 4+ 4+   Ankle Dorsiflexion (L4) 4 4   Great Toe Extension (L5) 4+ 4+   Ankle Plantarflexion (S1) 4+ 4+   Knee Flexion (S1,2) 4 4   Abdominals 4-     Paraspinals 4-     Back Rotators 4- 4-   Gluteus Derrick 4- 4-   Hip Abduction 4- 4-   Current: no significant change since initial evaluation 2/15/21     4.   Patient will improve pain in low back to 3/10 at worst to improve ADL tolerance and restore prior level of function.   Eval Status: 10/10 at worst  Current: 5-6/10 today 2/15/21         PLAN  [x]  Upgrade activities as tolerated     [x]  Continue plan of care  []  Update interventions per flow sheet       []  Discharge due to:_  []  Other:_      Carline Kolb, PT 2/15/2021  10:19 AM    Future Appointments   Date Time Provider Chris Dover   2/18/2021  8:45 AM Kenan Abdullahi, PT [facility]   2/22/2021  4:15 PM Ceci Yao, PT [facility]   2/24/2021  4:15 PM Kenan Abdullahi, PT [facility] 3/15/2021  2:40 PM Gideon Rodriguez, [address] [place] [address]

## 2021-02-15 NOTE — PROGRESS NOTES
[facility]  [address], [address] [facility]  Ph [phone]  Fx [phone]    Physical Therapy Progress Note  Patient name: Cathryn Mesa Start of Care: 2021   Referral source: Perico Kruger MD : 1960                Medical Diagnosis: Neck pain [M54.2]  Low back pain [M54.5]    Onset Date:chronic, progressive                Treatment Diagnosis: Neck pain M54.2; Low back pain M54.5                                               Prior Hospitalization: see medical history Provider#: [id number]   Medications: Verified on Patient summary List    Comorbidities: thyroid problems, asthma, HTN, cholecystectomy, elbow procedure, lymphedema of bilateral LEs; hysterectomy   Prior Level of Function: functionally independent, no AD, active lifestyle, right hand dominant      Visits from Start of Care: 8    Missed Visits: 0    Goals/Measure of Progress:    Short Term Goals: To be accomplished in 2 weeks:  1. Patient will be independent and compliant with HEP to progress toward goals and restore functional mobility. Eval Status: issued at eval  Current : 2/15/2021 reviewed ex giving cues for posture and positioning .      2. Patient will improve neck FOTO score by 10 points to improve functional tolerance for exercise. Eval Status: FOTO 48  Current: FOTO 52, progressing 2/15/21     3. Patient will improve pain in neck pain to 5/10 at worst to improve ADL tolerance and restore prior level of function. Eval Status: 10/10 at worst  Current: 5-6/10 today improving 2/15/21     4. Pt will have painfree cervical AROM to aid in functional mechanics for ambulation/ADLs. Eval Status:   Cervical Left Right Comments:pain, area   Flexion 12       Extension 15   P! Side bend 30 28 P!    Rotation 35 40 P!                Current: 2/15/2021 Cervical rotation 40 deg right  , 45 deg left  30 sidebend right  , 30 right SB    extension 35deg  Flexion 30 deg          Long Term Goals: To be accomplished in 6 weeks:  1. Patient will improve low back FOTO score by 12 points to improve functional tolerance for ADLs and exercise. Eval Status: FOTO 48  Current: FOTO 39, regression although she reports functional improvement and decreased low back pain 2/15/21     2.   Pt will have painfree lumbar AROM to aid in functional mechanics for ambulation/ADLs. Eval Status:   Lumbar % AROM Comments:pain, area   Forward flexion 40-60 Fingertips to floor Stretch in HS   Extension 20-30 10% Pain in low back   SideBend right 20-30 53.5cm Pain on right side   SideBend left 20-30 53.5cm Pain on left side   Rotation right 5-10 10% Tight, restricted and increased pain on right   Rotation left 5-10 10% Tight, restricted and increased pain on left    Current: no significant change except for rotation improved to 25% bilaterally without increased pain 2/15/21     3.   Pt will have 5/5 bilateral UE and LE strength to return to goals of exercising and playing with grandkids without increased pain. Eval Status:   Shoulder L (0-5) R (0-5)   Shoulder Flexion 4+ 4+   Shoulder Extension 4+ 4+   Shoulder ABD 4+ 4+   Shoulder ADD       Shoulder IR 4+ 4+   Shoulder ER 4+ 4+                                             Cervical L (0-5) R (0-5)   Flexion 4-     Extension 4-     Side Bend 4- 4-   Rotation 4- 4-        L(0-5) R (0-5)   Hip Flexion (L1,2) 4 4   Knee Extension (L3,4) 4+ 4+   Ankle Dorsiflexion (L4) 4 4   Great Toe Extension (L5) 4+ 4+   Ankle Plantarflexion (S1) 4+ 4+   Knee Flexion (S1,2) 4 4   Abdominals 4-     Paraspinals 4-     Back Rotators 4- 4-   Gluteus Derrick 4- 4-   Hip Abduction 4- 4-   Current: no significant change since initial evaluation 2/15/21      4.   Patient will improve pain in low back to 3/10 at worst to improve ADL tolerance and restore prior level of function. Eval Status: 10/10 at worst  Current: 5-6/10 today 2/15/21            Key Functional Changes: Pt tolerated treatment session well today.  No complaints with added therex for core stability and strength today. She is making slow progress toward goals. She would benefit from continued skilled PT intervention to address remaining goals    Updated Goals:  To be achieved in 4 weeks:        ASSESSMENT/RECOMMENDATIONS:  [x]Continue therapy per initial plan/protocol at a frequency of  2 x per week for 4 weeks  []Continue therapy with the following recommended changes:_____________________ _____________________________ ________________________________________  []Discontinue therapy progressing towards or have reached established goals  []Discontinue therapy due to lack of appreciable progress towards goals  []Discontinue therapy due to lack of attendance or compliance  []Await Physician's recommendations/decisions regarding therapy  []Other:________________________________________________________________    Thank you for this referral.   Markos Yao, PT 2/15/2021 11:23 AM

## 2021-02-16 ENCOUNTER — APPOINTMENT (OUTPATIENT)
Dept: PHYSICAL THERAPY | Age: 61
End: 2021-02-16
Payer: COMMERCIAL

## 2021-02-18 ENCOUNTER — APPOINTMENT (OUTPATIENT)
Dept: PHYSICAL THERAPY | Age: 61
End: 2021-02-18
Payer: COMMERCIAL

## 2021-02-22 ENCOUNTER — APPOINTMENT (OUTPATIENT)
Dept: PHYSICAL THERAPY | Age: 61
End: 2021-02-22
Payer: COMMERCIAL

## 2021-02-24 ENCOUNTER — APPOINTMENT (OUTPATIENT)
Dept: PHYSICAL THERAPY | Age: 61
End: 2021-02-24
Payer: COMMERCIAL

## 2021-04-13 ENCOUNTER — HOSPITAL ENCOUNTER (OUTPATIENT)
Dept: PREADMISSION TESTING | Age: 61
Discharge: HOME | End: 2021-04-13
Payer: COMMERCIAL

## 2021-04-13 ENCOUNTER — HOSPITAL ENCOUNTER (OUTPATIENT)
Dept: LAB | Age: 61
Discharge: HOME | End: 2021-04-13

## 2021-04-13 LAB
ATRIAL RATE: 104 BPM
CALCULATED P AXIS, ECG09: 73 DEGREES
CALCULATED R AXIS, ECG10: 60 DEGREES
CALCULATED T AXIS, ECG11: 77 DEGREES
DIAGNOSIS, 93000: NORMAL
P-R INTERVAL, ECG05: 154 MS
Q-T INTERVAL, ECG07: 342 MS
QRS DURATION, ECG06: 84 MS
QTC CALCULATION (BEZET), ECG08: 449 MS
SENTARA SPECIMEN COL,SENBCF: NORMAL
VENTRICULAR RATE, ECG03: 104 BPM

## 2021-04-13 PROCEDURE — 93005 ELECTROCARDIOGRAM TRACING: CPT

## 2021-04-13 PROCEDURE — 99001 SPECIMEN HANDLING PT-LAB: CPT

## 2021-04-16 NOTE — PROGRESS NOTES
[facility]  [address] Willie Marcum, [address] [facility] [place]  Ph [phone]  Fx [phone]    Physical Therapy Discharge Summary    Patient name: Mitzi Garcia Start of Care: 1/21/2021   Referral Misti Andrews MD BYF: 9/38/2879                Medical Diagnosis: Neck pain [M54.2]  Low back pain [M54.5]    Onset Date:chronic, progressive                Treatment Diagnosis: Neck pain M54.2; Low back pain M54.5                                               Prior Hospitalization: see medical history Provider#: [id number]   Medications: Verified on Patient summary List    Comorbidities: thyroid problems, asthma, HTN, cholecystectomy, elbow procedure, lymphedema of bilateral LEs; hysterectomy   Prior Level of Function: functionally independent, no AD, active lifestyle, right hand dominant      Visits from Start of Care: 8    Missed Visits: 0    Reporting Period : 1/21/21 to 4/16/21    Summary of Care:    Unable to formally assess goals as pt failed to show for scheduled followup appts. Please see below for goals assessment while pt was current under the care of this clinic. Please DC to [facility]P at this time. Thank you for this referral. Pt will require new order if he/she requires further PT services. Short Term Goals: To be accomplished in 2 weeks:  1. Patient will be independent and compliant with HEP to progress toward goals and restore functional mobility. Eval Status: issued at eval  Current : 2/15/2021 reviewed ex giving cues for posture and positioning .      2. Patient will improve neck FOTO score by 10 points to improve functional tolerance for exercise. Eval Status: FOTO 48  Current: FOTO 52, progressing 2/15/21     3. Patient will improve pain in neck pain to 5/10 at worst to improve ADL tolerance and restore prior level of function. Eval Status: 10/10 at worst  Current: 5-6/10 today improving 2/15/21     4.  Pt will have painfree cervical AROM to aid in functional mechanics for ambulation/ADLs. Eval Status:   Cervical Left Right Comments:pain, area   Flexion 12       Extension 15   P! Side bend 30 28 P! Rotation 35 40 P!                Current: 2/15/2021 Cervical rotation 40 deg right  , 45 deg left  30 sidebend right  , 30 right SB    extension 35deg  Flexion 30 deg          Long Term Goals: To be accomplished in 6 weeks:  1. Patient will improve low back FOTO score by 12 points to improve functional tolerance for ADLs and exercise. Eval Status: FOTO 48  Current: FOTO 39, regression although she reports functional improvement and decreased low back pain 2/15/21     2.   Pt will have painfree lumbar AROM to aid in functional mechanics for ambulation/ADLs. Eval Status:   Lumbar % AROM Comments:pain, area   Forward flexion 40-60 Fingertips to floor Stretch in HS   Extension 20-30 10% Pain in low back   SideBend right 20-30 53.5cm Pain on right side   SideBend left 20-30 53.5cm Pain on left side   Rotation right 5-10 10% Tight, restricted and increased pain on right   Rotation left 5-10 10% Tight, restricted and increased pain on left    Current: no significant change except for rotation improved to 25% bilaterally without increased pain 2/15/21     3.   Pt will have 5/5 bilateral UE and LE strength to return to goals of exercising and playing with grandkids without increased pain.   Eval Status:   Shoulder L (0-5) R (0-5)   Shoulder Flexion 4+ 4+   Shoulder Extension 4+ 4+   Shoulder ABD 4+ 4+   Shoulder ADD       Shoulder IR 4+ 4+   Shoulder ER 4+ 4+                                             Cervical L (0-5) R (0-5)   Flexion 4-     Extension 4-     Side Bend 4- 4-   Rotation 4- 4-        L(0-5) R (0-5)   Hip Flexion (L1,2) 4 4   Knee Extension (L3,4) 4+ 4+   Ankle Dorsiflexion (L4) 4 4   Great Toe Extension (L5) 4+ 4+   Ankle Plantarflexion (S1) 4+ 4+   Knee Flexion (S1,2) 4 4   Abdominals 4-     Paraspinals 4-     Back Rotators 4- 4-   Gluteus Derrick 4- 4-   Hip Abduction 4- 4-   Current: no significant change since initial evaluation 2/15/21      4.   Patient will improve pain in low back to 3/10 at worst to improve ADL tolerance and restore prior level of function.   Eval Status: 10/10 at worst  Current: 5-6/10 today 2/15/21    ASSESSMENT/RECOMMENDATIONS:  [x]Discontinue therapy: []Patient has reached or is progressing toward set goals      [x]Patient is non-compliant or has abdicated      []Due to lack of appreciable progress towards set goals    Ashley Yao, PT 4/16/2021 12:29 PM

## 2021-04-27 ENCOUNTER — HOSPITAL ENCOUNTER (OUTPATIENT)
Dept: PREADMISSION TESTING | Age: 61
Discharge: HOME | End: 2021-04-27
Payer: COMMERCIAL

## 2021-04-27 PROCEDURE — U0003 INFECTIOUS AGENT DETECTION BY NUCLEIC ACID (DNA OR RNA); SEVERE ACUTE RESPIRATORY SYNDROME CORONAVIRUS 2 (SARS-COV-2) (CORONAVIRUS DISEASE [COVID-19]), AMPLIFIED PROBE TECHNIQUE, MAKING USE OF HIGH THROUGHPUT TECHNOLOGIES AS DESCRIBED BY CMS-2020-01-R: HCPCS

## 2021-04-28 LAB — SARS-COV-2, COV2NT: NOT DETECTED

## 2021-05-03 ENCOUNTER — ANESTHESIA EVENT (OUTPATIENT)
Dept: SURGERY | Age: 61
End: 2021-05-03
Payer: COMMERCIAL

## 2021-05-03 ENCOUNTER — ANESTHESIA (OUTPATIENT)
Dept: SURGERY | Age: 61
End: 2021-05-03
Payer: COMMERCIAL

## 2021-05-03 ENCOUNTER — HOSPITAL ENCOUNTER (OUTPATIENT)
Age: 61
Setting detail: OUTPATIENT SURGERY
Discharge: HOME | End: 2021-05-03
Attending: ORTHOPAEDIC SURGERY | Admitting: ORTHOPAEDIC SURGERY
Payer: COMMERCIAL

## 2021-05-03 ENCOUNTER — APPOINTMENT (OUTPATIENT)
Dept: GENERAL RADIOLOGY | Age: 61
End: 2021-05-03
Attending: ORTHOPAEDIC SURGERY
Payer: COMMERCIAL

## 2021-05-03 VITALS
WEIGHT: 169.56 LBS | DIASTOLIC BLOOD PRESSURE: 80 MMHG | OXYGEN SATURATION: 97 % | TEMPERATURE: 97.2 F | RESPIRATION RATE: 14 BRPM | HEART RATE: 85 BPM | SYSTOLIC BLOOD PRESSURE: 158 MMHG | HEIGHT: 66 IN | BODY MASS INDEX: 27.25 KG/M2

## 2021-05-03 DIAGNOSIS — M48.02 CERVICAL SPINAL STENOSIS: Primary | ICD-10-CM

## 2021-05-03 PROBLEM — M47.812 CERVICAL SPONDYLOSIS: Status: ACTIVE | Noted: 2021-05-03

## 2021-05-03 PROBLEM — M54.10 RADICULOPATHY OF ARM: Status: ACTIVE | Noted: 2021-05-03

## 2021-05-03 LAB
ABO + RH BLD: NORMAL
BLOOD GROUP ANTIBODIES SERPL: NORMAL
SPECIMEN EXP DATE BLD: NORMAL

## 2021-05-03 PROCEDURE — 77030018390 HC SPNG HEMSTAT2 J&J -B: Performed by: ORTHOPAEDIC SURGERY

## 2021-05-03 PROCEDURE — 77030020782 HC GWN BAIR PAWS FLX 3M -B: Performed by: ORTHOPAEDIC SURGERY

## 2021-05-03 PROCEDURE — 74011250636 HC RX REV CODE- 250/636: Performed by: NURSE ANESTHETIST, CERTIFIED REGISTERED

## 2021-05-03 PROCEDURE — 36415 COLL VENOUS BLD VENIPUNCTURE: CPT

## 2021-05-03 PROCEDURE — 74011000250 HC RX REV CODE- 250: Performed by: ORTHOPAEDIC SURGERY

## 2021-05-03 PROCEDURE — 74011250636 HC RX REV CODE- 250/636: Performed by: ORTHOPAEDIC SURGERY

## 2021-05-03 PROCEDURE — 76010000153 HC OR TIME 1.5 TO 2 HR: Performed by: ORTHOPAEDIC SURGERY

## 2021-05-03 PROCEDURE — C1713 ANCHOR/SCREW BN/BN,TIS/BN: HCPCS | Performed by: ORTHOPAEDIC SURGERY

## 2021-05-03 PROCEDURE — 77030008683 HC TU ET CUF COVD -A: Performed by: ANESTHESIOLOGY

## 2021-05-03 PROCEDURE — 77030003028 HC SUT VCRL J&J -A: Performed by: ORTHOPAEDIC SURGERY

## 2021-05-03 PROCEDURE — 77030006643: Performed by: ANESTHESIOLOGY

## 2021-05-03 PROCEDURE — 77030010507 HC ADH SKN DERMBND J&J -B: Performed by: ORTHOPAEDIC SURGERY

## 2021-05-03 PROCEDURE — 74011000250 HC RX REV CODE- 250: Performed by: NURSE ANESTHETIST, CERTIFIED REGISTERED

## 2021-05-03 PROCEDURE — 76210000016 HC OR PH I REC 1 TO 1.5 HR: Performed by: ORTHOPAEDIC SURGERY

## 2021-05-03 PROCEDURE — 86901 BLOOD TYPING SEROLOGIC RH(D): CPT

## 2021-05-03 PROCEDURE — 74011250636 HC RX REV CODE- 250/636: Performed by: ANESTHESIOLOGY

## 2021-05-03 PROCEDURE — 77030008477 HC STYL SATN SLP COVD -A: Performed by: ANESTHESIOLOGY

## 2021-05-03 PROCEDURE — 2709999900 HC NON-CHARGEABLE SUPPLY: Performed by: ORTHOPAEDIC SURGERY

## 2021-05-03 PROCEDURE — 76210000021 HC REC RM PH II 0.5 TO 1 HR: Performed by: ORTHOPAEDIC SURGERY

## 2021-05-03 PROCEDURE — 76060000034 HC ANESTHESIA 1.5 TO 2 HR: Performed by: ORTHOPAEDIC SURGERY

## 2021-05-03 PROCEDURE — 74011000250 HC RX REV CODE- 250: Performed by: ANESTHESIOLOGY

## 2021-05-03 PROCEDURE — C1889 IMPLANT/INSERT DEVICE, NOC: HCPCS | Performed by: ORTHOPAEDIC SURGERY

## 2021-05-03 PROCEDURE — 77030033138 HC SUT PGA STRATFX J&J -B: Performed by: ORTHOPAEDIC SURGERY

## 2021-05-03 PROCEDURE — 77030020268 HC MISC GENERAL SUPPLY: Performed by: ORTHOPAEDIC SURGERY

## 2021-05-03 PROCEDURE — 77030040361 HC SLV COMPR DVT MDII -B: Performed by: ORTHOPAEDIC SURGERY

## 2021-05-03 DEVICE — I-FACTOR PUTTY, 1.0CC SYRINGE
Type: IMPLANTABLE DEVICE | Site: SPINE CERVICAL | Status: FUNCTIONAL
Brand: I-FACTOR PEPTIDE ENHANCED BONE GRAFT

## 2021-05-03 DEVICE — IMPLANTABLE DEVICE
Type: IMPLANTABLE DEVICE | Site: SPINE CERVICAL | Status: FUNCTIONAL
Brand: STALIF C-TI

## 2021-05-03 DEVICE — IMPLANTABLE DEVICE
Type: IMPLANTABLE DEVICE | Site: SPINE CERVICAL | Status: FUNCTIONAL
Brand: STALIF C

## 2021-05-03 RX ORDER — PROCHLORPERAZINE EDISYLATE 5 MG/ML
5 INJECTION INTRAMUSCULAR; INTRAVENOUS
Status: DISCONTINUED | OUTPATIENT
Start: 2021-05-03 | End: 2021-05-03 | Stop reason: HOSPADM

## 2021-05-03 RX ORDER — CEPHALEXIN 500 MG/1
500 CAPSULE ORAL 4 TIMES DAILY
Qty: 28 CAP | Refills: 0 | Status: SHIPPED | OUTPATIENT
Start: 2021-05-03 | End: 2021-05-10

## 2021-05-03 RX ORDER — SODIUM CHLORIDE, SODIUM LACTATE, POTASSIUM CHLORIDE, CALCIUM CHLORIDE 600; 310; 30; 20 MG/100ML; MG/100ML; MG/100ML; MG/100ML
125 INJECTION, SOLUTION INTRAVENOUS CONTINUOUS
Status: DISCONTINUED | OUTPATIENT
Start: 2021-05-03 | End: 2021-05-03 | Stop reason: HOSPADM

## 2021-05-03 RX ORDER — HYDROCODONE BITARTRATE AND ACETAMINOPHEN 5; 325 MG/1; MG/1
1 TABLET ORAL
Status: DISCONTINUED | OUTPATIENT
Start: 2021-05-03 | End: 2021-05-03 | Stop reason: HOSPADM

## 2021-05-03 RX ORDER — FLUMAZENIL 0.1 MG/ML
0.2 INJECTION INTRAVENOUS
Status: DISCONTINUED | OUTPATIENT
Start: 2021-05-03 | End: 2021-05-03 | Stop reason: HOSPADM

## 2021-05-03 RX ORDER — ACETAMINOPHEN 500 MG
1000 TABLET ORAL
COMMUNITY
End: 2021-05-03

## 2021-05-03 RX ORDER — HYDROMORPHONE HYDROCHLORIDE 1 MG/ML
0.5 INJECTION, SOLUTION INTRAMUSCULAR; INTRAVENOUS; SUBCUTANEOUS
Status: DISCONTINUED | OUTPATIENT
Start: 2021-05-03 | End: 2021-05-03 | Stop reason: HOSPADM

## 2021-05-03 RX ORDER — SODIUM CHLORIDE 0.9 % (FLUSH) 0.9 %
5-40 SYRINGE (ML) INJECTION AS NEEDED
Status: DISCONTINUED | OUTPATIENT
Start: 2021-05-03 | End: 2021-05-03 | Stop reason: HOSPADM

## 2021-05-03 RX ORDER — ROCURONIUM BROMIDE 10 MG/ML
INJECTION, SOLUTION INTRAVENOUS AS NEEDED
Status: DISCONTINUED | OUTPATIENT
Start: 2021-05-03 | End: 2021-05-03 | Stop reason: HOSPADM

## 2021-05-03 RX ORDER — METHOCARBAMOL 750 MG/1
750 TABLET, FILM COATED ORAL 4 TIMES DAILY
Qty: 60 TAB | Refills: 0 | Status: SHIPPED
Start: 2021-05-03 | End: 2021-07-16

## 2021-05-03 RX ORDER — DIPHENHYDRAMINE HYDROCHLORIDE 50 MG/ML
12.5 INJECTION, SOLUTION INTRAMUSCULAR; INTRAVENOUS
Status: DISCONTINUED | OUTPATIENT
Start: 2021-05-03 | End: 2021-05-03 | Stop reason: HOSPADM

## 2021-05-03 RX ORDER — NALOXONE HYDROCHLORIDE 0.4 MG/ML
0.1 INJECTION, SOLUTION INTRAMUSCULAR; INTRAVENOUS; SUBCUTANEOUS AS NEEDED
Status: DISCONTINUED | OUTPATIENT
Start: 2021-05-03 | End: 2021-05-03 | Stop reason: HOSPADM

## 2021-05-03 RX ORDER — DEXAMETHASONE SODIUM PHOSPHATE 4 MG/ML
INJECTION, SOLUTION INTRA-ARTICULAR; INTRALESIONAL; INTRAMUSCULAR; INTRAVENOUS; SOFT TISSUE AS NEEDED
Status: DISCONTINUED | OUTPATIENT
Start: 2021-05-03 | End: 2021-05-03 | Stop reason: HOSPADM

## 2021-05-03 RX ORDER — HYDROMORPHONE HYDROCHLORIDE 1 MG/ML
INJECTION, SOLUTION INTRAMUSCULAR; INTRAVENOUS; SUBCUTANEOUS AS NEEDED
Status: DISCONTINUED | OUTPATIENT
Start: 2021-05-03 | End: 2021-05-03 | Stop reason: HOSPADM

## 2021-05-03 RX ORDER — FENTANYL CITRATE 50 UG/ML
INJECTION, SOLUTION INTRAMUSCULAR; INTRAVENOUS AS NEEDED
Status: DISCONTINUED | OUTPATIENT
Start: 2021-05-03 | End: 2021-05-03 | Stop reason: HOSPADM

## 2021-05-03 RX ORDER — FENTANYL CITRATE 50 UG/ML
25 INJECTION, SOLUTION INTRAMUSCULAR; INTRAVENOUS AS NEEDED
Status: DISCONTINUED | OUTPATIENT
Start: 2021-05-03 | End: 2021-05-03 | Stop reason: HOSPADM

## 2021-05-03 RX ORDER — KETAMINE HYDROCHLORIDE 10 MG/ML
INJECTION, SOLUTION INTRAMUSCULAR; INTRAVENOUS AS NEEDED
Status: DISCONTINUED | OUTPATIENT
Start: 2021-05-03 | End: 2021-05-03 | Stop reason: HOSPADM

## 2021-05-03 RX ORDER — LIDOCAINE HYDROCHLORIDE 20 MG/ML
INJECTION, SOLUTION EPIDURAL; INFILTRATION; INTRACAUDAL; PERINEURAL AS NEEDED
Status: DISCONTINUED | OUTPATIENT
Start: 2021-05-03 | End: 2021-05-03 | Stop reason: HOSPADM

## 2021-05-03 RX ORDER — HYDROCODONE BITARTRATE AND ACETAMINOPHEN 5; 325 MG/1; MG/1
1 TABLET ORAL
Qty: 28 TAB | Refills: 0 | Status: SHIPPED | OUTPATIENT
Start: 2021-05-03 | End: 2021-05-10

## 2021-05-03 RX ORDER — PROPOFOL 10 MG/ML
INJECTION, EMULSION INTRAVENOUS AS NEEDED
Status: DISCONTINUED | OUTPATIENT
Start: 2021-05-03 | End: 2021-05-03 | Stop reason: HOSPADM

## 2021-05-03 RX ORDER — MIDAZOLAM HYDROCHLORIDE 1 MG/ML
INJECTION, SOLUTION INTRAMUSCULAR; INTRAVENOUS AS NEEDED
Status: DISCONTINUED | OUTPATIENT
Start: 2021-05-03 | End: 2021-05-03 | Stop reason: HOSPADM

## 2021-05-03 RX ORDER — SODIUM CHLORIDE 0.9 % (FLUSH) 0.9 %
5-40 SYRINGE (ML) INJECTION EVERY 8 HOURS
Status: DISCONTINUED | OUTPATIENT
Start: 2021-05-03 | End: 2021-05-03 | Stop reason: HOSPADM

## 2021-05-03 RX ORDER — METHOCARBAMOL 750 MG/1
750 TABLET, FILM COATED ORAL 4 TIMES DAILY
Status: DISCONTINUED | OUTPATIENT
Start: 2021-05-03 | End: 2021-05-03 | Stop reason: HOSPADM

## 2021-05-03 RX ORDER — CEFAZOLIN SODIUM/WATER 2 G/20 ML
2 SYRINGE (ML) INTRAVENOUS ONCE
Status: COMPLETED | OUTPATIENT
Start: 2021-05-03 | End: 2021-05-03

## 2021-05-03 RX ORDER — ONDANSETRON 2 MG/ML
INJECTION INTRAMUSCULAR; INTRAVENOUS AS NEEDED
Status: DISCONTINUED | OUTPATIENT
Start: 2021-05-03 | End: 2021-05-03 | Stop reason: HOSPADM

## 2021-05-03 RX ORDER — LABETALOL HYDROCHLORIDE 5 MG/ML
5 INJECTION, SOLUTION INTRAVENOUS AS NEEDED
Status: COMPLETED | OUTPATIENT
Start: 2021-05-03 | End: 2021-05-03

## 2021-05-03 RX ADMIN — SODIUM CHLORIDE, SODIUM LACTATE, POTASSIUM CHLORIDE, AND CALCIUM CHLORIDE 125 ML/HR: 600; 310; 30; 20 INJECTION, SOLUTION INTRAVENOUS at 12:40

## 2021-05-03 RX ADMIN — HYDROMORPHONE HYDROCHLORIDE 0.5 MG: 1 INJECTION, SOLUTION INTRAMUSCULAR; INTRAVENOUS; SUBCUTANEOUS at 15:12

## 2021-05-03 RX ADMIN — PROPOFOL 150 MG: 10 INJECTION, EMULSION INTRAVENOUS at 14:03

## 2021-05-03 RX ADMIN — ROCURONIUM BROMIDE 50 MG: 10 INJECTION, SOLUTION INTRAVENOUS at 14:03

## 2021-05-03 RX ADMIN — HYDROMORPHONE HYDROCHLORIDE 0.5 MG: 1 INJECTION, SOLUTION INTRAMUSCULAR; INTRAVENOUS; SUBCUTANEOUS at 16:07

## 2021-05-03 RX ADMIN — HYDROMORPHONE HYDROCHLORIDE 0.5 MG: 1 INJECTION, SOLUTION INTRAMUSCULAR; INTRAVENOUS; SUBCUTANEOUS at 16:22

## 2021-05-03 RX ADMIN — PROPOFOL 50 MG: 10 INJECTION, EMULSION INTRAVENOUS at 14:04

## 2021-05-03 RX ADMIN — HYDROMORPHONE HYDROCHLORIDE 0.5 MG: 1 INJECTION, SOLUTION INTRAMUSCULAR; INTRAVENOUS; SUBCUTANEOUS at 16:37

## 2021-05-03 RX ADMIN — KETAMINE HYDROCHLORIDE 10 MG: 10 INJECTION, SOLUTION INTRAMUSCULAR; INTRAVENOUS at 14:28

## 2021-05-03 RX ADMIN — FENTANYL CITRATE 50 MCG: 50 INJECTION, SOLUTION INTRAMUSCULAR; INTRAVENOUS at 14:15

## 2021-05-03 RX ADMIN — FENTANYL CITRATE 50 MCG: 50 INJECTION, SOLUTION INTRAMUSCULAR; INTRAVENOUS at 14:03

## 2021-05-03 RX ADMIN — SODIUM CHLORIDE, SODIUM LACTATE, POTASSIUM CHLORIDE, AND CALCIUM CHLORIDE: 600; 310; 30; 20 INJECTION, SOLUTION INTRAVENOUS at 14:35

## 2021-05-03 RX ADMIN — MIDAZOLAM 2 MG: 1 INJECTION INTRAMUSCULAR; INTRAVENOUS at 13:57

## 2021-05-03 RX ADMIN — CEFAZOLIN 2 G: 1 INJECTION, POWDER, FOR SOLUTION INTRAVENOUS at 14:10

## 2021-05-03 RX ADMIN — LABETALOL HYDROCHLORIDE 5 MG: 5 INJECTION INTRAVENOUS at 16:23

## 2021-05-03 RX ADMIN — KETAMINE HYDROCHLORIDE 20 MG: 10 INJECTION, SOLUTION INTRAMUSCULAR; INTRAVENOUS at 14:13

## 2021-05-03 RX ADMIN — HYDROMORPHONE HYDROCHLORIDE 0.5 MG: 1 INJECTION, SOLUTION INTRAMUSCULAR; INTRAVENOUS; SUBCUTANEOUS at 14:28

## 2021-05-03 RX ADMIN — LIDOCAINE HYDROCHLORIDE 60 MG: 20 INJECTION, SOLUTION EPIDURAL; INFILTRATION; INTRACAUDAL; PERINEURAL at 14:03

## 2021-05-03 RX ADMIN — KETAMINE HYDROCHLORIDE 10 MG: 10 INJECTION, SOLUTION INTRAMUSCULAR; INTRAVENOUS at 14:42

## 2021-05-03 RX ADMIN — LABETALOL HYDROCHLORIDE 5 MG: 5 INJECTION INTRAVENOUS at 16:40

## 2021-05-03 RX ADMIN — ONDANSETRON HYDROCHLORIDE 4 MG: 2 INJECTION INTRAMUSCULAR; INTRAVENOUS at 15:26

## 2021-05-03 RX ADMIN — HYDROMORPHONE HYDROCHLORIDE 0.5 MG: 1 INJECTION, SOLUTION INTRAMUSCULAR; INTRAVENOUS; SUBCUTANEOUS at 14:42

## 2021-05-03 RX ADMIN — DEXAMETHASONE SODIUM PHOSPHATE 8 MG: 4 INJECTION, SOLUTION INTRAMUSCULAR; INTRAVENOUS at 14:13

## 2021-05-03 RX ADMIN — HYDROMORPHONE HYDROCHLORIDE 0.5 MG: 1 INJECTION, SOLUTION INTRAMUSCULAR; INTRAVENOUS; SUBCUTANEOUS at 15:10

## 2021-05-03 NOTE — PERIOP NOTES
Reviewed PTA medication list with patient/caregiver and patient/caregiver denies any additional medications. Patient admits to having a responsible adult care for them at home for at least 24 hours after surgery. Patient encouraged to use gown warming system and informed that using said warming gown to regulate body temperature prior to a procedure has been shown to help reduce the risks of blood clots and infection. Patient's pharmacy of choice verified and documented in PTA medication section. Dual skin assessment & fall risk band verification completed with Ada PACHECO.

## 2021-05-03 NOTE — INTERVAL H&P NOTE
Update History & Physical    The Patient's History and Physical of May 3,   Chart was reviewed with the patient and I examined the patient. There was no change. The surgical site was confirmed by the patient and me. Plan:  The risk, benefits, expected outcome, and alternative to the recommended procedure have been discussed with the patient. Patient understands and wants to proceed with the procedure.     Electronically signed by Bill Leon MD on 5/3/2021 at 1:20 PM

## 2021-05-03 NOTE — ANESTHESIA POSTPROCEDURE EVALUATION
Post-Anesthesia Evaluation and Assessment    Cardiovascular Function/Vital Signs  Visit Vitals  BP (!) 152/81   Pulse 85   Temp 36.4 °C (97.5 °F)   Resp 13   Ht 5' 6\" (1.676 m)   Wt 76.9 kg (169 lb 9 oz)   SpO2 96%   BMI 27.37 kg/m²       Patient is status post Procedure(s):  ANTERIOR CERVICAL DISCECTOMY AND FUSION,CERVICAL 4-CERVICAL 6 W/C-ARM,ASTURA MARICRUZ,SPINAL ELEMENTS. Nausea/Vomiting: Controlled. Postoperative hydration reviewed and adequate. Pain:  Pain Scale 1: Visual (05/03/21 1655)  Pain Intensity 1: 0 (05/03/21 1655)   Managed. Neurological Status:   Neuro (WDL): Within Defined Limits (05/03/21 1645)   At baseline. Mental Status and Level of Consciousness: Arousable. Pulmonary Status:   O2 Device: None (Room air) (05/03/21 1645)   Adequate oxygenation and airway patent. Complications related to anesthesia: None    Post-anesthesia assessment completed. No concerns. Patient has met all discharge requirements.     Signed By: Xavier Kiser CRNA    May 3, 2021

## 2021-05-03 NOTE — DISCHARGE INSTRUCTIONS
Dr. Trenton Morales Operative Instructions: Cervical Fusion    *YOU MUST AVOID SMOKING OR BEING AROUND ANYONE WHO SMOKES. AVOID ALL PRODUCTS THAT CONTAIN NICOTINE. DO NOT TAKE IBUPROFEN OR ANTI--INFLAMMATORIES, AS THESE MAY ALTER THE HEALING OF THE FUSION. Diet:   1. Begin with liquids and light foods such as jell-o or soups  2. Advance as tolerated to your regular diet if not nauseated. 3.  Swallowing difficulties may be experienced up to 2 weeks post-operatively. Modify food thickness as necessary. You may also obtain over-the-counter chloraseptic spray. Medications:  1. Strong oral narcotic pain medications have been prescribed for the first few days. Use only as directed. No pain medication is capable of taking away all the pain. Taking your pills at regular intervals will give you the best chance of having less pain. 2. If you need a refill PLEASE PLAN AHEAD. Call our office during regular hours (8-5). 3. Do not combine with alcoholic beverages. 4. Be careful as you walk, climb stairs or drive as mild dizziness is not unusual.  5. Do not take medications that have not been prescribed by your surgeon. 6. You may switch to over the counter pain medication of your choice as you become more comfortable. Activity and Restrictions:  1. You should not drive until you are clear by your surgeon at your post-operative visit. 2.  In regards to your cervical collar, you MUST wear this at all times until your first follow-up appointment. 3.  You should wear the collar even when sleeping. 4.  It can be removed for short periods of time as long as you are keeping your head centered over the shoulders without rotating or looking up or down. 5. You may take short walks inside and outside of your home and climb stairs. 6. You are to avoid work, housework, yard work, snow shoveling, lifting of more than a few pounds, overhead work or strenuous activity.   7. Avoid any excessive stretching or range-of-motion of the neck. Non-painful range-of-motion of the neck is allowed  8. Follow-up with Dr. Denice Velazquez in 7-10 days. DRIVIN. You should not drive until after your follow-up appointment. 2.  You can be in a vehicle for short distances, but if you travel any long distance, please stop about every 30 minutes and walk/stretch. 3.  You should NEVER drive while taking narcotic medication. BATHING and INCISION CARE:  1. The incision may be tender to touch or feel numb: this is normal.   2.  Keep the incision clean and dry no showering until your follow-up appointment. The incision will be closed with sutures under the skin and the skin will be glued. 3.  Do not apply any lotions, ointments or oils on the incision. 4.  If you notice any excessive swelling, redness, or persistent drainage around the incision, notify the office immediately. RETURN TO WORK :  1. The decision to return to work will be determined on an individual basis. 2.  Many people who have a strenuous job (construction, heavy labor, etc) may need to be off work for up to 8 weeks. 3.  If you need a work note, please let us know as soon as possible, and not the same day you are planning to return to work. NUTRITION :  1.  Good nutrition is an essential part of healing. 2.  You should eat a balanced diet each day, including fruits, vegetables, dairy products and protein. 3.  Remember to drink plenty of water. 4.  If you have not had a bowel movement within 3 days of surgery, you will need to use a laxative or suppository that can be obtained over-the-counter at your local pharmacy. BONE STIMULATOR:  1. A spinal bone stimulator may be prescribed for you under certain situations. 2.  A nurse will call you if one has been requested and discuss its use for approximately 4-6 months post-op every day. 3.  This device assists in bone healing and fusion.     CALL THE OFFICE:   If you have severe pain unrelieved by the medications, new numbness or tingling in your arms;   If you have a fever of 101.0°F or greater;    If you notice excessive swelling, redness, or persistent drainage from the incision or IV site; [facility] office number is [phone] from 8:00am to 5:00pm Monday through Friday. After 5:00pm, on weekends, or holidays, please leave a message with our answering service and the doctor on-call will get back to you shortly. DISCHARGE SUMMARY from Nurse    PATIENT INSTRUCTIONS:    After general anesthesia or intravenous sedation, for 24 hours or while taking prescription Narcotics:  · Limit your activities  · Do not drive and operate hazardous machinery  · Do not make important personal or business decisions  · Do  not drink alcoholic beverages  · If you have not urinated within 8 hours after discharge, please contact your surgeon on call. Report the following to your surgeon:  · Excessive pain, swelling, redness or odor of or around the surgical area  · Temperature over 100.5  · Nausea and vomiting lasting longer than 4 hours or if unable to take medications  · Any signs of decreased circulation or nerve impairment to extremity: change in color, persistent  numbness, tingling, coldness or increase pain  · Any questions    What to do at Home:  Recommended activity: Activity as tolerated and no driving for today and Ambulate in house    If you experience any of the following symptoms as stated above, please follow up with Dr. Darius Fortune. *  Please give a list of your current medications to your Primary Care Provider. *  Please update this list whenever your medications are discontinued, doses are      changed, or new medications (including over-the-counter products) are added. *  Please carry medication information at all times in case of emergency situations.     These are general instructions for a healthy lifestyle:    No smoking/ No tobacco products/ Avoid exposure to second hand smoke  Surgeon General's Warning:  Quitting smoking now greatly reduces serious risk to your health. Obesity, smoking, and sedentary lifestyle greatly increases your risk for illness    A healthy diet, regular physical exercise & weight monitoring are important for maintaining a healthy lifestyle    You may be retaining fluid if you have a history of heart failure or if you experience any of the following symptoms:  Weight gain of 3 pounds or more overnight or 5 pounds in a week, increased swelling in our hands or feet or shortness of breath while lying flat in bed. Please call your doctor as soon as you notice any of these symptoms; do not wait until your next office visit. The discharge information has been reviewed with the caregiver. The caregiver verbalized understanding. Discharge medications reviewed with the caregiver and appropriate educational materials and side effects teaching were provided. Patient armband removed and shredded    ___________________________________________________________________________________________________________________________________    Learning About Coronavirus ([id number])  Coronavirus (COVID-19): Overview  What is coronavirus ([id number]-41)? The coronavirus disease (COVID-19) is caused by a virus. It is an illness that was first found in [place], [place], in December 2019. It has since spread worldwide. The virus can cause fever, cough, and trouble breathing. In severe cases, it can cause pneumonia and make it hard to breathe without help. It can cause death. Coronaviruses are a large group of viruses. They cause the common cold. They also cause more serious illnesses like Middle East respiratory syndrome (MERS) and severe acute respiratory syndrome (SARS). COVID-19 is caused by a novel coronavirus. That means it's a new type that has not been seen in people before. This virus spreads person-to-person through droplets from coughing and sneezing.  It can also spread when you are close to someone who is infected. And it can spread when you touch something that has the virus on it, such as a doorknob or a tabletop. What can you do to protect yourself from coronavirus (COVID-19)? The best way to protect yourself from getting sick is to:  · Avoid areas where there is an outbreak. · Avoid contact with people who may be infected. · Wash your hands often with soap or alcohol-based hand sanitizers. · Avoid crowds and try to stay at least 6 feet away from other people. · Wash your hands often, especially after you cough or sneeze. Use soap and water, and scrub for at least 20 seconds. If soap and water aren't available, use an alcohol-based hand . · Avoid touching your mouth, nose, and eyes. What can you do to avoid spreading the virus to others? To help avoid spreading the virus to others:  · Cover your mouth with a tissue when you cough or sneeze. Then throw the tissue in the trash. · Use a disinfectant to clean things that you touch often. · Stay home if you are sick or have been exposed to the virus. Don't go to school, work, or public areas. And don't use public transportation. · If you are sick:  ? Leave your home only if you need to get medical care. But call the doctor's office first so they know you're coming. And wear a face mask, if you have one.  ? If you have a face mask, wear it whenever you're around other people. It can help stop the spread of the virus when you cough or sneeze. ? Clean and disinfect your home every day. Use household  and disinfectant wipes or sprays. Take special care to clean things that you grab with your hands. These include doorknobs, remote controls, phones, and handles on your refrigerator and microwave. And don't forget countertops, tabletops, bathrooms, and computer keyboards. When to call for help  Call 911 anytime you think you may need emergency care.  For example, call if:  · You have severe trouble breathing. (You can't talk at all.)  · You have constant chest pain or pressure. · You are severely dizzy or lightheaded. · You are confused or can't think clearly. · Your face and lips have a blue color. · You pass out (lose consciousness) or are very hard to wake up. Call your doctor now if you develop symptoms such as:  · Shortness of breath. · Fever. · Cough. If you need to get care, call ahead to the doctor's office for instructions before you go. Make sure you wear a face mask, if you have one, to prevent exposing other people to the virus. Where can you get the latest information? The following health organizations are tracking and studying this virus. Their websites contain the most up-to-date information. Mckayla Ahumadah also learn what to do if you think you may have been exposed to the virus. · U.S. Centers for Disease Control and Prevention (CDC): The CDC provides updated news about the disease and travel advice. The website also tells you how to prevent the spread of infection. www.cdc.gov  · World Health Organization [facility]): WHO offers information about the virus outbreaks. WHO also has travel advice. www.who.int  Current as of: April 1, 2020               Content Version: 12.4  © 4904-2222 [employer]. Care instructions adapted under license by your healthcare professional. If you have questions about a medical condition or this instruction, always ask your healthcare professional. [address] any warranty or liability for your use of this information.

## 2021-05-03 NOTE — OP NOTES
OPERATIVE NOTE    Patient: Kevin Hong MRN: [id number]  SSN: [id number]    YOB: 1960  Age: 64 y.o. Sex: female      Indications: This is a 64y.o. year-old female who presents with neck pain. She was positive for stenosis per MRI. The patient was admitted for surgery as conservative measures have failed. Date of Procedure: 5/3/2021     Preoperative Diagnosis:  SPONDYLOSIS WITH RADICULOPATHY CERVICAL REGION    Postoperative Diagnosis:    SPONDYLOSIS WITH RADICULOPATHY CERVICAL REGION      Procedure: Procedure(s):  ANTERIOR CERVICAL DISCECTOMY AND FUSION,CERVICAL 4-CERVICAL 6 W/C-ARM,ASTURA MARICRUZ,SPINAL ELEMENTS    Surgeon:  Sara Quinones DO ,Surgical Assistant: Val Reese PA-C    Assistant: Kwadwo Blunt: Zak Lemons RN  Circ-2: Tico Mancilla RN  Circ-Relief: Dorcas Barnard RN  Physician Assistant: Corey Charles PA-C  Radiology Technician: Shanae Mcgill RN-2: Naomi Bridges RN  Surg Asst-1: Charmayne Bridges  Surg Asst-Relief: Ramo Irene  Nurse Practitioner: Marbella uRsso NP    Anesthesia: general    Estimated Blood Loss: * No values recorded between 5/3/2021  2:20 PM and 5/3/2021  3:29 PM *    Specimens: * No specimens in log *     Drains: Hemovac    Implants:   Implant Name Type Inv.  Item Serial No.  Lot No. LRB No. Used Action   PUTTY BNE GRFT 1 CC W/ SYR I FACTOR - [id number]  PUTTY BNE GRFT 1 CC W/ SYR I FACTOR  [employer] [id number] N/A 1 Implanted   CAGE SPNL H6.5MM AP D12MM 6DEG TAPR LORDOSIS ANTR CERV PEEK - [id number]  CAGE SPNL H6.5MM AP D12MM 6DEG TAPR LORDOSIS ANTR CERV PEEK  [employer] [id number] N/A 1 Implanted   SCREW SPNL L14MM DIA4MM LNG ANTR CERV TI SOREN ABO STALIF C - [id number]  SCREW SPNL L14MM DIA4MM LNG ANTR CERV TI SOREN ABO STALIF C  [employer] [id number] N/A 3 Implanted   CAGE SPNL H6.5MM AP D12MM 6DEG TAPR LORDOSIS ANTR CERV PEEK - [id number]  CAGE SPNL H6.5MM AP D12MM 6DEG TAPR LORDOSIS ANTR CERV PEEK  [facility] [id number] N/A 1 Implanted   SCREW SPNL L14MM DIA4MM LNG ANTR CERV TI SOREN ABO STALIF C - [id number]  SCREW SPNL L14MM DIA4MM LNG ANTR CERV TI SOREN ABO STALIF C  [facility] SPINE [facility] 2020-1089 N/A 2 Implanted   SCREW SPNL L14MM DIA4MM LNG ANTR CERV TI SOREN ABO STALIF C - [id number]  SCREW SPNL L14MM DIA4MM LNG ANTR CERV TI SOREN ABO STALIF C  [facility] SPINE [facility] [id number] N/A 1 Implanted       Complications: None; patient tolerated the procedure well. Procedure: The patient was greeted by Anesthesia and taken to the operative suite, where the patient underwent general endotracheal anesthesia. The patient was positioned in the supine positioned in the supine position on a standard radiolucent Hima spine table. The head was held in 5 pounds of traction through the mandible and the occiput, utilizing a Holter apparatus. A towel roll was placed between the patient's shoulders to allow gentle extension of the cervical spine, and the arms were held at the patient's side. There cervical spine was sterilely prepped and draped in a standard fashion. Fluoroscopy confirmed the Cervical level 4-6, and a 2inch incision was made over the left anterior cervical spine in line with the disk space. The platysma was transected and undermined. The investing fascia over the medial border of the sternocleidomastoid muscle was sharply dissected. Blunt dissection was utilized to palpate the carotid pulsation and to dissect over the osteophytes of the cervical spine. The longus coli muscles were mobilized with electrocautery and retractors were positioned to protect the soft tissue and neurovascular structures. A needle was positioned in the Cervical 4-6disk space and confirmed fluoroscopically to be the appropriate level. A rongeur was utilized to remove all osteophytes over the anterior border of Cervical level 4-6.   A complete diskectomy was performed at Cervical level 4-6 for purposes of decompression, secondary to spinal stenosis, utilizing a combination of pituitary rongeurs, Kerrison rongeurs, and curettes. The curettes were utilized to completely remove all cartilage from the superior and the inferior endplate to expose the underlying endplate and create a bleeding surface. The posterior uncinate spurs were completely resect ed, as well as a complete resection of the posterior longitudinal ligament. At the completion of the decompression, a nerve hook could be passed out each neural foramen. There was no residual stenosis noted. The disk space had been squared off and rasped. Progressive trial spacers were positioned at each level for trial.  The most appropriate height and width cage was placed. An appropriate width and sized PEEK/Ti threaded machine interbody cage was chosen. The I-factor biological was placed inside the PEEK/Ti cage, and subsequently impacted into the Cervical 4-6 disk space for disk height restoration, lordosis correction, and interbody fusion. My Physician Assistant was utilized as a co-surgeon for this case to include vascular retraction, suction, graft sizing, screw placement, irrigation and final closure of surgical incision. This assistance was instrumental to the safety and success of this surgical case. X-rays confirmed excellent position of the interbody cage. [employer] Spine anterior cervical was contoured to the cervical spine and locked into position with 6 locking screws in Cervical level 4-6. Xray's confirmed excellent position without abnormalities. All bleeding was cauterized with bipolar electrocautery and hemostatic agents. A deep TLS drain was placed. The platysma was re approximated with 2-0 Vicryl suture. The skin edges were re approximated with 2-0 Vicryl in subcutaneous fashion, and the skin was closed with a running 3-0 Stratofix in subcuticular fashion.   A layer of Dermabond skin sealant was placed, as well as a soft sterile dressing and a hard cervical collar. The patient recovered from anesthesia and was transferred to the postanesthesia care unit in stable condition.       Maria Isabel Darden MD  5/3/2021  3:29 PM

## 2021-05-03 NOTE — ANESTHESIA PREPROCEDURE EVALUATION
Relevant Problems   No relevant active problems       Anesthetic History     PONV          Review of Systems / Medical History  Patient summary reviewed, nursing notes reviewed and pertinent labs reviewed    Pulmonary            Asthma : well controlled       Neuro/Psych   Within defined limits           Cardiovascular    Hypertension: well controlled                   GI/Hepatic/Renal     GERD: well controlled           Endo/Other      Hypothyroidism: well controlled  Arthritis     Other Findings              Physical Exam    Airway  Mallampati: II  TM Distance: 4 - 6 cm  Neck ROM: normal range of motion   Mouth opening: Normal     Cardiovascular    Rhythm: regular  Rate: normal         Dental  No notable dental hx       Pulmonary  Breath sounds clear to auscultation               Abdominal  GI exam deferred       Other Findings            Anesthetic Plan    ASA: 2  Anesthesia type: general          Induction: Intravenous  Anesthetic plan and risks discussed with: Patient

## 2021-05-03 NOTE — H&P
History and Physical        Patient: Lena Frost               Sex: female          DOA: (Not on file)         YOB: 1960      Age:  64 y.o.        LOS:  LOS: 0 days        HPI:     Lena Frost is a 64 y.o. female who complains of neck and upper extremity pain. She has pain in the  bilaterally cervical paraspinals and then travels into the bilaterally upper extremity. She has numbness/tingling in the same distribution of their pain. She denies weakness in the bilateral upper extremity. She denies any fevers, chills or night sweats. At this time the patient has failed conservative care including anti-inflammatories, analgesics, physical therapy and injections. Shewould like to move forward with surgical intervention since their pain affects their activities of daily living. Past Medical History:   Diagnosis Date    Asthma     seasonal    GERD (gastroesophageal reflux disease)     Hypertension     Hypothyroidism     Nausea & vomiting     UTI (urinary tract infection)        Past Surgical History:   Procedure Laterality Date    HX CHOLECYSTECTOMY      HX HYSTERECTOMY      HX ORTHOPAEDIC Right 2016    elbow    HX ORTHOPAEDIC Right 2017    toe       Family History   Problem Relation Age of Onset    Stroke Father     Diabetes Brother        Social History     Socioeconomic History    Marital status:      Spouse name: Not on file    Number of children: Not on file    Years of education: Not on file    Highest education level: Not on file   Tobacco Use    Smoking status: Never Smoker    Smokeless tobacco: Never Used   Substance and Sexual Activity    Alcohol use: Not Currently    Drug use: Never       Prior to Admission medications    Medication Sig Start Date End Date Taking? Authorizing Provider   famotidine (PEPCID) 40 mg tablet Take 40 mg by mouth every evening. Provider, Historical   polyethylene glycol 3350 (MIRALAX PO) Take  by mouth daily. Provider, Historical   fluticasone propionate (FLONASE) 50 mcg/actuation nasal spray 2 Sprays by Both Nostrils route daily. Provider, Historical   azelastine (ASTELIN) 137 mcg (0.1 %) nasal spray 1 [place] by Both Nostrils route two (2) times a day. Use in each nostril as directed    Provider, Historical   phentermine 37.5 mg capsule Take 37.5 mg by mouth every morning. Provider, Historical   Cetirizine (ZyrTEC) 10 mg cap Take 10 mg by mouth. Provider, Historical   multivitamin (ONE A DAY) tablet Take 1 Tab by mouth daily. Provider, Historical   red yeast rice extract 600 mg cap Take 600 mg by mouth two (2) times a day. Provider, Historical   melatonin 5 mg cap capsule Take 10 mg by mouth nightly. Provider, Historical   cyclobenzaprine (FLEXERIL) 10 mg tablet Take 10 mg by mouth nightly. Provider, Historical   krill/om-3/dha/epa/phospho/ast (MAXIMUM RED KRILL OMEGA-3 PO) Take  by mouth. Provider, Historical   cholecalciferol (Vitamin D3) 25 mcg (1,000 unit) cap Take 1,000 Units by mouth daily. Provider, Historical   calcium carbonate (CALCIUM 300 PO) Take  by mouth. Provider, Historical   Biotin 2,500 mcg cap Take  by mouth daily. Provider, Historical   ascorbic acid, vitamin C, (Vitamin C) 250 mg tablet Take 250 mg by mouth. Provider, Historical   cyanocobalamin (Vitamin B-12) 100 mcg tablet Take 100 mcg by mouth daily. Provider, Historical   vit B Cmplx 3-FA-Vit C-Biotin (NEPHRO ALPHONSO RX) 1- mg-mg-mcg tablet Take 1 Tab by mouth daily. Provider, Historical   albuterol (PROVENTIL HFA, VENTOLIN HFA, PROAIR HFA) 90 mcg/actuation inhaler Take 1 Puff by inhalation every six (6) hours as needed for Wheezing. Provider, Historical   estrogens, conjugated (CONJUGATED ESTROGENS INJECTION) by Injection route. Injections q12 weeks. Provider, Historical   progesterone (PROMETRIUM) 200 mg capsule Take 300 mg by mouth every evening. [facility] pharmacy dispensed.     Provider, Historical   cranberry extract 450 mg tab tablet Take 450 mg by mouth. Provider, Historical   D-MANNOSE PO Take  by mouth. Provider, Historical   Dexlansoprazole (DEXILANT) 60 mg CpDB Take 60 mg by mouth daily (with dinner). Provider, Historical   hydroCHLOROthiazide (MICROZIDE) 12.5 mg capsule Take 25 mg by mouth daily. Provider, Historical   thyroid, Pork, (ARMOUR THYROID) 15 mg tablet Take 60 mg by mouth daily. Provider, Historical       No Known Allergies    Review of Systems  A comprehensive review of systems was negative except for that written in the History of Present Illness. Physical Exam:      There were no vitals taken for this visit. Physical Exam:  General: Alert and Oriented X 3  Lungs: Clear to ausculation bilaterally  Cardiovascular: Regular Rate and Rhythm, without murmur  Abdomen: Soft, nontender with positive bowel sounds in all four quadrants  Gential/Rectal: deferred  Musculoskeletal: The patient has full range of motion in the cervical spine in flexion, extension and side rotation bilaterally. The patient has pain with flexion or extension. The patient has tenderness over the bilateral cervical paraspinals. The patient is neurologically intact in the upper extremities. Pulses are 2+. Radiographic evaluation shows cervical spondylosis C4-C6       Labs Reviewed: All lab results for the last 24 hours reviewed. Assessment/Plan     Principal Problem:    Cervical spinal stenosis (5/3/2021)    Active Problems:    Cervical spondylosis (5/3/2021)      Radiculopathy of arm (5/3/2021)        We are going to move forward with an ACDF C4-C6. The risks vs the benefits have been discussed with the patient. The patient will follow-up in the office 10 days postoperatively and call with any and all concerns.

## 2021-05-05 ENCOUNTER — TELEPHONE (OUTPATIENT)
Dept: OTHER | Age: 61
End: 2021-05-05

## 2021-05-05 NOTE — TELEPHONE ENCOUNTER
Post Discharge Phone placed after spine surgery   Spoke with Ame Reese was taken out. Patient general feeling since discharge is going okay. Pain Control (0-10 score): 5  Pain management reviewed:     Reviewed pain medications. Reviewed neck precautions. Reviewed walking every hour while awake     Denies N/V. Swallowing is manageable. Reviewed the importance of sitting with head slightly elevated the first few nights to help prevent neck swelling. Appetite is fair. Reviewed what a dental soft diet is and the importance of eating  healthy to heal and have energy. Bowels have not moved yet but taking a stool softener. Patient states dressing is intact without drainage.    Denies fever, cough, chest pain, SOB  Denies any unusual symptoms    Follow up appointment is scheduled with surgeon     States discharge instructions were clear and easy to understand has no questions    Discussed calling surgeon or PCP for concerns

## 2021-06-30 ENCOUNTER — HOSPITAL ENCOUNTER (OUTPATIENT)
Dept: LAB | Age: 61
Discharge: HOME | End: 2021-06-30

## 2021-06-30 ENCOUNTER — HOSPITAL ENCOUNTER (OUTPATIENT)
Dept: NON INVASIVE DIAGNOSTICS | Age: 61
Discharge: HOME | End: 2021-06-30
Payer: COMMERCIAL

## 2021-06-30 LAB
ATRIAL RATE: 91 BPM
CALCULATED P AXIS, ECG09: 74 DEGREES
CALCULATED R AXIS, ECG10: 23 DEGREES
CALCULATED T AXIS, ECG11: 66 DEGREES
DIAGNOSIS, 93000: NORMAL
P-R INTERVAL, ECG05: 170 MS
Q-T INTERVAL, ECG07: 348 MS
QRS DURATION, ECG06: 78 MS
QTC CALCULATION (BEZET), ECG08: 428 MS
SENTARA SPECIMEN COL,SENBCF: NORMAL
VENTRICULAR RATE, ECG03: 91 BPM

## 2021-06-30 PROCEDURE — 99001 SPECIMEN HANDLING PT-LAB: CPT

## 2021-06-30 PROCEDURE — 93005 ELECTROCARDIOGRAM TRACING: CPT

## 2021-07-12 NOTE — NURSE NAVIGATOR
Violette Collier watched the preoperative spine seminar online and received a preoperative spine education book in anticipation of surgery.      Orthopaedic Nurse Navigator

## 2021-07-13 PROBLEM — M47.816 LUMBAR SPONDYLOSIS: Status: ACTIVE | Noted: 2021-07-13

## 2021-07-13 PROBLEM — M48.061 LUMBAR SPINAL STENOSIS: Status: ACTIVE | Noted: 2021-07-13

## 2021-07-13 NOTE — H&P
History and Physical        Patient: Sarahi Mcmullen               Sex: female          DOA: (Not on file)         YOB: 1960      Age:  64 y.o.        LOS:  LOS: 0 days        HPI:     Sarahi Mcmullen is a 64 y.o. female who has a history of back and lower extremity pain. Their pain is typically across the lower back and travels into the bilaterally lower extremity down the lateral, posterior aspect of the leg. She has numbness/tingling in the same distribution of their pain. She denies weakness in the bilateral lower extremity. Their pain is worse with ambulation and better at rest. She has failed conservative care including anti-inflammatories, analgesics, physical therapy and injections. Their pain affects their activities of daily living and would like to move forward with surgical intervention.        Past Medical History:   Diagnosis Date    Arthritis     back    Asthma     seasonal    GERD (gastroesophageal reflux disease)     Hypertension     Hypothyroidism     Nausea & vomiting     Other spondylosis with radiculopathy, lumbar region     UTI (urinary tract infection)        Past Surgical History:   Procedure Laterality Date    HX CERVICAL FUSION  2021    HX CHOLECYSTECTOMY      HX HYSTERECTOMY      HX ORTHOPAEDIC Right 2016    elbow    HX ORTHOPAEDIC Right 2017    toe       Family History   Problem Relation Age of Onset    Stroke Father     Diabetes Brother        Social History     Socioeconomic History    Marital status:      Spouse name: Not on file    Number of children: Not on file    Years of education: Not on file    Highest education level: Not on file   Tobacco Use    Smoking status: Never Smoker    Smokeless tobacco: Never Used   Vaping Use    Vaping Use: Never used   Substance and Sexual Activity    Alcohol use: Not Currently    Drug use: Never     Social Determinants of Health     Financial Resource Strain:     Difficulty of Paying Living Expenses:    Food Insecurity:     Worried About Running Out of Food in the Last Year:     [address] in the Last Year:    Transportation Needs:     Lack of Transportation (Medical):  Lack of Transportation (Non-Medical):    Physical Activity:     Days of Exercise per Week:     Minutes of Exercise per Session:    Stress:     Feeling of Stress :    Social Connections:     Frequency of Communication with Friends and Family:     Frequency of Social Gatherings with Friends and Family:     Attends [employer] Services:     Active Member of Clubs or Organizations:     Attends Club or Organization Meetings:     Marital Status:        Prior to Admission medications    Medication Sig Start Date End Date Taking? Authorizing Provider   methocarbamoL (ROBAXIN) 750 mg tablet Take 1 Tab by mouth four (4) times daily. 5/3/21   Eulalio Pace PA-C   famotidine (PEPCID) 40 mg tablet Take 40 mg by mouth every evening. Provider, Historical   polyethylene glycol 3350 (MIRALAX PO) Take  by mouth daily. Provider, Historical   fluticasone propionate (FLONASE) 50 mcg/actuation nasal spray 2 Sprays by Both Nostrils route daily. Provider, Historical   azelastine (ASTELIN) 137 mcg (0.1 %) nasal spray 1 [place] by Both Nostrils route two (2) times a day. Use in each nostril as directed    Provider, Historical   Cetirizine (ZyrTEC) 10 mg cap Take 10 mg by mouth. Provider, Historical   multivitamin (ONE A DAY) tablet Take 1 Tab by mouth daily. Provider, Historical   red yeast rice extract 600 mg cap Take 600 mg by mouth two (2) times a day. Provider, Historical   melatonin 5 mg cap capsule Take 10 mg by mouth nightly. Provider, Historical   krill/om-3/dha/epa/phospho/ast (MAXIMUM RED KRILL OMEGA-3 PO) Take  by mouth. Provider, Historical   cholecalciferol (Vitamin D3) 25 mcg (1,000 unit) cap Take 1,000 Units by mouth daily. Provider, Historical   calcium carbonate (CALCIUM 300 PO) Take  by mouth. Provider, Historical   Biotin 2,500 mcg cap Take  by mouth daily. Provider, Historical   ascorbic acid, vitamin C, (Vitamin C) 250 mg tablet Take 250 mg by mouth. Provider, Historical   cyanocobalamin (Vitamin B-12) 100 mcg tablet Take 100 mcg by mouth daily. Provider, Historical   vit B Cmplx 3-FA-Vit C-Biotin (NEPHRO ALPHONSO RX) 1- mg-mg-mcg tablet Take 1 Tab by mouth daily. Provider, Historical   albuterol (PROVENTIL HFA, VENTOLIN HFA, PROAIR HFA) 90 mcg/actuation inhaler Take 1 Puff by inhalation every six (6) hours as needed for Wheezing. Provider, Historical   estrogens, conjugated (CONJUGATED ESTROGENS INJECTION) by Injection route. Injections q12 weeks. Provider, Historical   progesterone (PROMETRIUM) 200 mg capsule Take 300 mg by mouth every evening. [facility] pharmacy dispensed. Provider, Historical   cranberry extract 450 mg tab tablet Take 450 mg by mouth. Provider, Historical   Dexlansoprazole (DEXILANT) 60 mg CpDB Take 60 mg by mouth daily (with dinner). Provider, Historical   hydroCHLOROthiazide (MICROZIDE) 12.5 mg capsule Take 25 mg by mouth daily. Provider, Historical   thyroid, Pork, ([place]edar Thyroid) 60 mg tablet Take 60 mg by mouth daily. Provider, Historical       No Known Allergies    Review of Systems  A comprehensive review of systems was negative except for that written in the History of Present Illness. Physical Exam:      There were no vitals taken for this visit. Physical Exam:  General: Alert and Oriented X 3  Lungs: Clear to ausculation bilaterally  Cardiovascular: Regular Rate and Rhythm, without murmur  Abdomen: Soft, nontender with positive bowel sounds in all four quadrants  Gential/Rectal: deferred  Musculoskeletal: The paitent has full range of motion of the lumbar spine in flexion, extension and side bending bilaterally. The patient has pain with flexion or extension.   There is not pain with internal and external rotation of the bilaterally hip. The patient is tender across the left paralumbar, right paralumbar muscles. The patient is neurologically intact in the lower extremities. There is a Negative straight leg raise. His pulses are 2+. Calves are nontender. Radiographic evaluation show Lumbar DDD at L4-S1      Labs Reviewed: All lab results for the last 24 hours reviewed. Assessment/Plan     Principal Problem:    Lumbar spinal stenosis (7/13/2021)    Active Problems:    Radiculopathy of leg (5/3/2021)      Lumbar spondylosis (7/13/2021)        We are going to move forward with a decompressive lumbar laminectomy and instrumented fusion at L4-S1. The risks vs the benefits have been discussed with the patient. They will follow-up with us in the hospital 10 days post-operatively and call with any and all concerns.

## 2021-07-13 NOTE — DISCHARGE INSTRUCTIONS
[facility]  Dr. Marc Ko Post-Operative Instructions Lumbar Fusion    *YOU MUST AVOID SMOKING OR BEING AROUND ANYONE WHO SMOKES. AVOID ALL PRODUCTS THAT CONTAIN NICOTINE. DO NOT TAKE IBUPROFEN OR ANTI--INFLAMMATORIES, AS THESE MAY ALTER THE HEALING OF THE FUSION. ACTIVITIES :  *The first week after surgery   1. You may be up and walking about the house. 2.  Activities around the house, such as washing dishes, fixing light meals, and your own personal care are fine. 3.  Avoid strenuous activities, such as vacuuming, lifting laundry or grocery bags. 4.  Do not lift anything heavier than 1 gallon of milk (or about 5-8 pounds). 5.  Do not bend over to  items from the ground level until 3 months post-op. *Week 2 and beyond  1. You may gradually increase your activities, but still avoid heavy lifting, pushing/pulling. 2.  Walking is the best way to rebuild strength and stamina. Start SLOWLY and gradually increase the distance a little every week. 3.  Walk at a pace that avoids fatigue or severe pain. Do not try to walk several blocks the first day! As you increase the distance, you may feel tired. If so, stop and rest.   4.  You should be able to walk several blocks by your first clinic visit. 5.  Follow-up with Dr. Jaylen Finn in 10-14 days. BATHING and INCISION CARE:  1. The incision may be tender to touch or feel numb: this is normal.   2.  Keep the incision clean and dry no showering until your follow-up appointment. The incision will be closed with sutures under the skin and the skin will be glued. 3.  Do not apply any lotions, ointments or oils on the incision. 4.  If you notice any excessive swelling, redness, or persistent drainage around the incision, notify the office immediately. DRIVIN. You should not drive until after your follow-up appointment.    2.  You can be in a vehicle for short distances, but if you travel any long distance, please stop about every 30 minutes and walk/stretch. 3.  You should NEVER drive while taking narcotic medication. RETURN TO WORK :  1. The decision to return to work will be determined on an individual basis. 2.  Many people who have a strenuous job (construction, heavy labor, etc) may need to be off work for up to 12 weeks. 3.  If you need a work note, please let us know as soon as possible, and not the same day you are planning to return to work. NUTRITION :  1.  Good nutrition is an essential part of healing. 2.  You should eat a balanced diet each day, including fruits, vegetables, dairy products and protein. 3.  Remember to drink plenty of water. 4.  If you have not had a bowel movement within 3 days of surgery, you will need to use a laxative or suppository that can be obtained over-the-counter at your local pharmacy. BONE STIMULATOR:  1. A spinal bone stimulator may be prescribed for you under certain situations. 2.  A nurse will call you if one has been requested and discuss its use for approximately 4-6 months post-op every day. 3.  This device assists in bone healing and fusion. MEDICATIONS -  1. You may resume the medications you were taking before surgery, with the general exception of (or DO NOT TAKE) non-steroidal anti-inflammatory medications, such as Motrin, Aleve, Advil Naprosyn, Ibuprofen or aspirin. 2.  You will receive a prescription for pain medication at discharge from the hospital. The pain medication works best if taken before the pain becomes severe. 3.  To reduce stomach upset, always take the medication with food. 4.  Begin to wean yourself off the pain medication during the second week after discharge. 5.  If you need a refill, please call the office during working hours at least 2 days before your prescription runs out. Do not wait until your bottle is empty to call for a refill. 6.  DO NOT drive if you are taking narcotic pain medications.     HOME HEALTH CARE:  1.   A home health care service has been set-up for you to help assist you once you leave the hospital.  2.  They will contact you either before you leave the hospital or within 24 hours once you have been discharged home. 3. A nurse will assist you with your dressing changes and a Physical Therapist with help you with your therapy needs. CALL THE OFFICE:   If you have severe pain unrelieved by the medications, new numbness or tingling in your legs;   If you have a fever of 101.0°F or greater;    If you notice excessive swelling, redness, or persistent drainage from the incision or IV site; [facility] office number is [phone] from 8:00am to 5:00pm Monday through Friday. After 5:00pm, on weekends, or holidays, please leave a message with our answering service and the doctor on-call will get back to you shortly.

## 2021-07-15 ENCOUNTER — ANESTHESIA EVENT (OUTPATIENT)
Dept: SURGERY | Age: 61
End: 2021-07-15
Payer: COMMERCIAL

## 2021-07-15 ENCOUNTER — HOSPITAL ENCOUNTER (OUTPATIENT)
Age: 61
Setting detail: OBSERVATION
Discharge: HOME HEALTH | End: 2021-07-16
Attending: ORTHOPAEDIC SURGERY | Admitting: ORTHOPAEDIC SURGERY
Payer: COMMERCIAL

## 2021-07-15 ENCOUNTER — ANESTHESIA (OUTPATIENT)
Dept: SURGERY | Age: 61
End: 2021-07-15
Payer: COMMERCIAL

## 2021-07-15 ENCOUNTER — APPOINTMENT (OUTPATIENT)
Dept: GENERAL RADIOLOGY | Age: 61
End: 2021-07-15
Attending: ORTHOPAEDIC SURGERY
Payer: COMMERCIAL

## 2021-07-15 DIAGNOSIS — M54.10 RADICULOPATHY OF LEG: Primary | ICD-10-CM

## 2021-07-15 LAB
ABO + RH BLD: NORMAL
BLOOD GROUP ANTIBODIES SERPL: NORMAL
SPECIMEN EXP DATE BLD: NORMAL

## 2021-07-15 PROCEDURE — 74011250636 HC RX REV CODE- 250/636: Performed by: NURSE ANESTHETIST, CERTIFIED REGISTERED

## 2021-07-15 PROCEDURE — 97116 GAIT TRAINING THERAPY: CPT

## 2021-07-15 PROCEDURE — 99218 HC RM OBSERVATION: CPT

## 2021-07-15 PROCEDURE — 74011000250 HC RX REV CODE- 250: Performed by: NURSE ANESTHETIST, CERTIFIED REGISTERED

## 2021-07-15 PROCEDURE — 77010033678 HC OXYGEN DAILY

## 2021-07-15 PROCEDURE — C1889 IMPLANT/INSERT DEVICE, NOC: HCPCS | Performed by: ORTHOPAEDIC SURGERY

## 2021-07-15 PROCEDURE — 77030040830 HC CATH URETH FOL MDII -A: Performed by: ORTHOPAEDIC SURGERY

## 2021-07-15 PROCEDURE — 76010000132 HC OR TIME 2.5 TO 3 HR: Performed by: ORTHOPAEDIC SURGERY

## 2021-07-15 PROCEDURE — 77030033138 HC SUT PGA STRATFX J&J -B: Performed by: ORTHOPAEDIC SURGERY

## 2021-07-15 PROCEDURE — 36415 COLL VENOUS BLD VENIPUNCTURE: CPT

## 2021-07-15 PROCEDURE — 77030010784 HC BOWL MX BN MEDT -C: Performed by: ORTHOPAEDIC SURGERY

## 2021-07-15 PROCEDURE — 77030010507 HC ADH SKN DERMBND J&J -B: Performed by: ORTHOPAEDIC SURGERY

## 2021-07-15 PROCEDURE — 74011250636 HC RX REV CODE- 250/636: Performed by: PHYSICIAN ASSISTANT

## 2021-07-15 PROCEDURE — 74011000258 HC RX REV CODE- 258: Performed by: ORTHOPAEDIC SURGERY

## 2021-07-15 PROCEDURE — 77030004402 HC BUR NEUR STRY -C: Performed by: ORTHOPAEDIC SURGERY

## 2021-07-15 PROCEDURE — 76210000006 HC OR PH I REC 0.5 TO 1 HR: Performed by: ORTHOPAEDIC SURGERY

## 2021-07-15 PROCEDURE — 77030004435 HC BUR RND STRY -C: Performed by: ORTHOPAEDIC SURGERY

## 2021-07-15 PROCEDURE — C1713 ANCHOR/SCREW BN/BN,TIS/BN: HCPCS | Performed by: ORTHOPAEDIC SURGERY

## 2021-07-15 PROCEDURE — 74011000250 HC RX REV CODE- 250: Performed by: ORTHOPAEDIC SURGERY

## 2021-07-15 PROCEDURE — 77030008477 HC STYL SATN SLP COVD -A: Performed by: STUDENT IN AN ORGANIZED HEALTH CARE EDUCATION/TRAINING PROGRAM

## 2021-07-15 PROCEDURE — 77030013708 HC HNDPC SUC IRR PULS STRY –B: Performed by: ORTHOPAEDIC SURGERY

## 2021-07-15 PROCEDURE — 77030038552 HC DRN WND MDII -A: Performed by: ORTHOPAEDIC SURGERY

## 2021-07-15 PROCEDURE — 2709999900 HC NON-CHARGEABLE SUPPLY: Performed by: ORTHOPAEDIC SURGERY

## 2021-07-15 PROCEDURE — 77030018390 HC SPNG HEMSTAT2 J&J -B: Performed by: ORTHOPAEDIC SURGERY

## 2021-07-15 PROCEDURE — 77030034475 HC MISC IMPL SPN: Performed by: ORTHOPAEDIC SURGERY

## 2021-07-15 PROCEDURE — 77030006643: Performed by: STUDENT IN AN ORGANIZED HEALTH CARE EDUCATION/TRAINING PROGRAM

## 2021-07-15 PROCEDURE — 74011250636 HC RX REV CODE- 250/636: Performed by: ORTHOPAEDIC SURGERY

## 2021-07-15 PROCEDURE — C9290 INJ, BUPIVACAINE LIPOSOME: HCPCS | Performed by: ORTHOPAEDIC SURGERY

## 2021-07-15 PROCEDURE — 74011250637 HC RX REV CODE- 250/637: Performed by: PHYSICIAN ASSISTANT

## 2021-07-15 PROCEDURE — 77030003029 HC SUT VCRL J&J -B: Performed by: ORTHOPAEDIC SURGERY

## 2021-07-15 PROCEDURE — 77030008683 HC TU ET CUF COVD -A: Performed by: STUDENT IN AN ORGANIZED HEALTH CARE EDUCATION/TRAINING PROGRAM

## 2021-07-15 PROCEDURE — 97161 PT EVAL LOW COMPLEX 20 MIN: CPT

## 2021-07-15 PROCEDURE — 74011000250 HC RX REV CODE- 250: Performed by: PHYSICIAN ASSISTANT

## 2021-07-15 PROCEDURE — 74011250636 HC RX REV CODE- 250/636: Performed by: STUDENT IN AN ORGANIZED HEALTH CARE EDUCATION/TRAINING PROGRAM

## 2021-07-15 PROCEDURE — 76060000036 HC ANESTHESIA 2.5 TO 3 HR: Performed by: ORTHOPAEDIC SURGERY

## 2021-07-15 PROCEDURE — 77030012890

## 2021-07-15 PROCEDURE — 77030040361 HC SLV COMPR DVT MDII -B: Performed by: ORTHOPAEDIC SURGERY

## 2021-07-15 PROCEDURE — 77030020782 HC GWN BAIR PAWS FLX 3M -B: Performed by: ORTHOPAEDIC SURGERY

## 2021-07-15 PROCEDURE — 74011250637 HC RX REV CODE- 250/637: Performed by: STUDENT IN AN ORGANIZED HEALTH CARE EDUCATION/TRAINING PROGRAM

## 2021-07-15 PROCEDURE — 86850 RBC ANTIBODY SCREEN: CPT

## 2021-07-15 PROCEDURE — 77030034401: Performed by: ORTHOPAEDIC SURGERY

## 2021-07-15 DEVICE — IMPLANTABLE DEVICE: Type: IMPLANTABLE DEVICE | Site: SPINE LUMBAR | Status: FUNCTIONAL

## 2021-07-15 DEVICE — I-FACTOR PUTTY, 2.5CC SYRINGE
Type: IMPLANTABLE DEVICE | Site: SPINE LUMBAR | Status: FUNCTIONAL
Brand: I-FACTOR PEPTIDE ENHANCED BONE GRAFT

## 2021-07-15 RX ORDER — HYDROMORPHONE HYDROCHLORIDE 2 MG/ML
INJECTION, SOLUTION INTRAMUSCULAR; INTRAVENOUS; SUBCUTANEOUS AS NEEDED
Status: DISCONTINUED | OUTPATIENT
Start: 2021-07-15 | End: 2021-07-15 | Stop reason: HOSPADM

## 2021-07-15 RX ORDER — SODIUM CHLORIDE 0.9 % (FLUSH) 0.9 %
5-40 SYRINGE (ML) INJECTION EVERY 8 HOURS
Status: DISCONTINUED | OUTPATIENT
Start: 2021-07-15 | End: 2021-07-15 | Stop reason: HOSPADM

## 2021-07-15 RX ORDER — HYDROMORPHONE HYDROCHLORIDE 2 MG/1
2-4 TABLET ORAL
Status: DISCONTINUED | OUTPATIENT
Start: 2021-07-15 | End: 2021-07-16 | Stop reason: HOSPADM

## 2021-07-15 RX ORDER — NALOXONE HYDROCHLORIDE 0.4 MG/ML
0.04 INJECTION, SOLUTION INTRAMUSCULAR; INTRAVENOUS; SUBCUTANEOUS
Status: DISCONTINUED | OUTPATIENT
Start: 2021-07-15 | End: 2021-07-15 | Stop reason: HOSPADM

## 2021-07-15 RX ORDER — MIDAZOLAM HYDROCHLORIDE 1 MG/ML
INJECTION, SOLUTION INTRAMUSCULAR; INTRAVENOUS AS NEEDED
Status: DISCONTINUED | OUTPATIENT
Start: 2021-07-15 | End: 2021-07-15 | Stop reason: HOSPADM

## 2021-07-15 RX ORDER — NALOXONE HYDROCHLORIDE 0.4 MG/ML
0.4 INJECTION, SOLUTION INTRAMUSCULAR; INTRAVENOUS; SUBCUTANEOUS AS NEEDED
Status: DISCONTINUED | OUTPATIENT
Start: 2021-07-15 | End: 2021-07-16 | Stop reason: HOSPADM

## 2021-07-15 RX ORDER — SODIUM CHLORIDE, SODIUM LACTATE, POTASSIUM CHLORIDE, CALCIUM CHLORIDE 600; 310; 30; 20 MG/100ML; MG/100ML; MG/100ML; MG/100ML
125 INJECTION, SOLUTION INTRAVENOUS CONTINUOUS
Status: DISCONTINUED | OUTPATIENT
Start: 2021-07-15 | End: 2021-07-16

## 2021-07-15 RX ORDER — PROGESTERONE 100 MG/1
300 CAPSULE ORAL EVERY EVENING
Status: DISCONTINUED | OUTPATIENT
Start: 2021-07-15 | End: 2021-07-16 | Stop reason: HOSPADM

## 2021-07-15 RX ORDER — SODIUM CHLORIDE 0.9 % (FLUSH) 0.9 %
5-40 SYRINGE (ML) INJECTION EVERY 8 HOURS
Status: DISCONTINUED | OUTPATIENT
Start: 2021-07-15 | End: 2021-07-16 | Stop reason: HOSPADM

## 2021-07-15 RX ORDER — GLYCOPYRROLATE 0.2 MG/ML
INJECTION INTRAMUSCULAR; INTRAVENOUS AS NEEDED
Status: DISCONTINUED | OUTPATIENT
Start: 2021-07-15 | End: 2021-07-15 | Stop reason: HOSPADM

## 2021-07-15 RX ORDER — LIDOCAINE HYDROCHLORIDE 20 MG/ML
INJECTION, SOLUTION EPIDURAL; INFILTRATION; INTRACAUDAL; PERINEURAL AS NEEDED
Status: DISCONTINUED | OUTPATIENT
Start: 2021-07-15 | End: 2021-07-15 | Stop reason: HOSPADM

## 2021-07-15 RX ORDER — DIPHENHYDRAMINE HYDROCHLORIDE 50 MG/ML
12.5 INJECTION, SOLUTION INTRAMUSCULAR; INTRAVENOUS
Status: DISCONTINUED | OUTPATIENT
Start: 2021-07-15 | End: 2021-07-15 | Stop reason: HOSPADM

## 2021-07-15 RX ORDER — LORATADINE 10 MG/1
10 TABLET ORAL
Status: DISCONTINUED | OUTPATIENT
Start: 2021-07-15 | End: 2021-07-16 | Stop reason: HOSPADM

## 2021-07-15 RX ORDER — GABAPENTIN 300 MG/1
300 CAPSULE ORAL
Status: DISCONTINUED | OUTPATIENT
Start: 2021-07-15 | End: 2021-07-16 | Stop reason: HOSPADM

## 2021-07-15 RX ORDER — ALBUTEROL SULFATE 90 UG/1
1 AEROSOL, METERED RESPIRATORY (INHALATION)
Status: DISCONTINUED | OUTPATIENT
Start: 2021-07-15 | End: 2021-07-16 | Stop reason: HOSPADM

## 2021-07-15 RX ORDER — ROCURONIUM BROMIDE 10 MG/ML
INJECTION, SOLUTION INTRAVENOUS AS NEEDED
Status: DISCONTINUED | OUTPATIENT
Start: 2021-07-15 | End: 2021-07-15 | Stop reason: HOSPADM

## 2021-07-15 RX ORDER — NALBUPHINE HYDROCHLORIDE 10 MG/ML
5 INJECTION, SOLUTION INTRAMUSCULAR; INTRAVENOUS; SUBCUTANEOUS
Status: DISCONTINUED | OUTPATIENT
Start: 2021-07-15 | End: 2021-07-15 | Stop reason: HOSPADM

## 2021-07-15 RX ORDER — TEMAZEPAM 15 MG/1
15 CAPSULE ORAL
Status: DISCONTINUED | OUTPATIENT
Start: 2021-07-15 | End: 2021-07-16 | Stop reason: HOSPADM

## 2021-07-15 RX ORDER — HYDROCHLOROTHIAZIDE 12.5 MG/1
25 CAPSULE ORAL DAILY
Status: DISCONTINUED | OUTPATIENT
Start: 2021-07-15 | End: 2021-07-16 | Stop reason: HOSPADM

## 2021-07-15 RX ORDER — KETAMINE HYDROCHLORIDE 10 MG/ML
INJECTION, SOLUTION INTRAMUSCULAR; INTRAVENOUS AS NEEDED
Status: DISCONTINUED | OUTPATIENT
Start: 2021-07-15 | End: 2021-07-15 | Stop reason: HOSPADM

## 2021-07-15 RX ORDER — FENTANYL CITRATE 50 UG/ML
50 INJECTION, SOLUTION INTRAMUSCULAR; INTRAVENOUS
Status: DISCONTINUED | OUTPATIENT
Start: 2021-07-15 | End: 2021-07-15 | Stop reason: HOSPADM

## 2021-07-15 RX ORDER — SODIUM CHLORIDE, SODIUM LACTATE, POTASSIUM CHLORIDE, CALCIUM CHLORIDE 600; 310; 30; 20 MG/100ML; MG/100ML; MG/100ML; MG/100ML
50 INJECTION, SOLUTION INTRAVENOUS CONTINUOUS
Status: DISCONTINUED | OUTPATIENT
Start: 2021-07-15 | End: 2021-07-15 | Stop reason: HOSPADM

## 2021-07-15 RX ORDER — HEPARIN SODIUM 10000 [USP'U]/ML
INJECTION, SOLUTION INTRAVENOUS; SUBCUTANEOUS AS NEEDED
Status: DISCONTINUED | OUTPATIENT
Start: 2021-07-15 | End: 2021-07-15 | Stop reason: HOSPADM

## 2021-07-15 RX ORDER — ONDANSETRON 2 MG/ML
4 INJECTION INTRAMUSCULAR; INTRAVENOUS
Status: DISCONTINUED | OUTPATIENT
Start: 2021-07-15 | End: 2021-07-16 | Stop reason: HOSPADM

## 2021-07-15 RX ORDER — CEFAZOLIN SODIUM/WATER 2 G/20 ML
2 SYRINGE (ML) INTRAVENOUS ONCE
Status: COMPLETED | OUTPATIENT
Start: 2021-07-15 | End: 2021-07-15

## 2021-07-15 RX ORDER — POLYETHYLENE GLYCOL 3350 17 G/17G
17 POWDER, FOR SOLUTION ORAL DAILY
Status: DISCONTINUED | OUTPATIENT
Start: 2021-07-16 | End: 2021-07-16 | Stop reason: HOSPADM

## 2021-07-15 RX ORDER — OXYCODONE HYDROCHLORIDE 5 MG/1
5-10 TABLET ORAL
Status: DISCONTINUED | OUTPATIENT
Start: 2021-07-15 | End: 2021-07-16 | Stop reason: HOSPADM

## 2021-07-15 RX ORDER — SODIUM CHLORIDE, SODIUM LACTATE, POTASSIUM CHLORIDE, CALCIUM CHLORIDE 600; 310; 30; 20 MG/100ML; MG/100ML; MG/100ML; MG/100ML
70 INJECTION, SOLUTION INTRAVENOUS CONTINUOUS
Status: DISCONTINUED | OUTPATIENT
Start: 2021-07-15 | End: 2021-07-16

## 2021-07-15 RX ORDER — NEOSTIGMINE METHYLSULFATE 1 MG/ML
INJECTION, SOLUTION INTRAVENOUS AS NEEDED
Status: DISCONTINUED | OUTPATIENT
Start: 2021-07-15 | End: 2021-07-15 | Stop reason: HOSPADM

## 2021-07-15 RX ORDER — FLUTICASONE PROPIONATE 50 MCG
2 SPRAY, SUSPENSION (ML) NASAL DAILY
Status: DISCONTINUED | OUTPATIENT
Start: 2021-07-15 | End: 2021-07-16 | Stop reason: HOSPADM

## 2021-07-15 RX ORDER — CYCLOBENZAPRINE HCL 10 MG
10 TABLET ORAL
Status: DISCONTINUED | OUTPATIENT
Start: 2021-07-15 | End: 2021-07-16 | Stop reason: HOSPADM

## 2021-07-15 RX ORDER — DIPHENHYDRAMINE HYDROCHLORIDE 50 MG/ML
12.5 INJECTION, SOLUTION INTRAMUSCULAR; INTRAVENOUS
Status: DISCONTINUED | OUTPATIENT
Start: 2021-07-15 | End: 2021-07-16 | Stop reason: HOSPADM

## 2021-07-15 RX ORDER — EPHEDRINE SULFATE/0.9% NACL/PF 50 MG/5 ML
SYRINGE (ML) INTRAVENOUS AS NEEDED
Status: DISCONTINUED | OUTPATIENT
Start: 2021-07-15 | End: 2021-07-15 | Stop reason: HOSPADM

## 2021-07-15 RX ORDER — SODIUM CHLORIDE 0.9 % (FLUSH) 0.9 %
5-40 SYRINGE (ML) INJECTION AS NEEDED
Status: DISCONTINUED | OUTPATIENT
Start: 2021-07-15 | End: 2021-07-15 | Stop reason: HOSPADM

## 2021-07-15 RX ORDER — HYDROMORPHONE HYDROCHLORIDE 1 MG/ML
0.5 INJECTION, SOLUTION INTRAMUSCULAR; INTRAVENOUS; SUBCUTANEOUS AS NEEDED
Status: DISCONTINUED | OUTPATIENT
Start: 2021-07-15 | End: 2021-07-15 | Stop reason: HOSPADM

## 2021-07-15 RX ORDER — PROPOFOL 10 MG/ML
INJECTION, EMULSION INTRAVENOUS AS NEEDED
Status: DISCONTINUED | OUTPATIENT
Start: 2021-07-15 | End: 2021-07-15 | Stop reason: HOSPADM

## 2021-07-15 RX ORDER — ACETAMINOPHEN 325 MG/1
650 TABLET ORAL
Status: DISCONTINUED | OUTPATIENT
Start: 2021-07-15 | End: 2021-07-16 | Stop reason: HOSPADM

## 2021-07-15 RX ORDER — SODIUM CHLORIDE 0.9 % (FLUSH) 0.9 %
5-40 SYRINGE (ML) INJECTION AS NEEDED
Status: DISCONTINUED | OUTPATIENT
Start: 2021-07-15 | End: 2021-07-16 | Stop reason: HOSPADM

## 2021-07-15 RX ORDER — FAMOTIDINE 20 MG/1
20 TABLET, FILM COATED ORAL EVERY 12 HOURS
Status: DISCONTINUED | OUTPATIENT
Start: 2021-07-15 | End: 2021-07-16 | Stop reason: HOSPADM

## 2021-07-15 RX ORDER — SCOLOPAMINE TRANSDERMAL SYSTEM 1 MG/1
1 PATCH, EXTENDED RELEASE TRANSDERMAL
Status: DISCONTINUED | OUTPATIENT
Start: 2021-07-15 | End: 2021-07-16 | Stop reason: HOSPADM

## 2021-07-15 RX ORDER — ONDANSETRON 2 MG/ML
INJECTION INTRAMUSCULAR; INTRAVENOUS AS NEEDED
Status: DISCONTINUED | OUTPATIENT
Start: 2021-07-15 | End: 2021-07-15 | Stop reason: HOSPADM

## 2021-07-15 RX ORDER — DEXAMETHASONE SODIUM PHOSPHATE 4 MG/ML
INJECTION, SOLUTION INTRA-ARTICULAR; INTRALESIONAL; INTRAMUSCULAR; INTRAVENOUS; SOFT TISSUE AS NEEDED
Status: DISCONTINUED | OUTPATIENT
Start: 2021-07-15 | End: 2021-07-15 | Stop reason: HOSPADM

## 2021-07-15 RX ORDER — CEFAZOLIN SODIUM/WATER 2 G/20 ML
2 SYRINGE (ML) INTRAVENOUS EVERY 8 HOURS
Status: COMPLETED | OUTPATIENT
Start: 2021-07-15 | End: 2021-07-16

## 2021-07-15 RX ORDER — BISACODYL 5 MG
5 TABLET, DELAYED RELEASE (ENTERIC COATED) ORAL DAILY PRN
Status: DISCONTINUED | OUTPATIENT
Start: 2021-07-15 | End: 2021-07-16 | Stop reason: HOSPADM

## 2021-07-15 RX ORDER — FENTANYL CITRATE 50 UG/ML
INJECTION, SOLUTION INTRAMUSCULAR; INTRAVENOUS AS NEEDED
Status: DISCONTINUED | OUTPATIENT
Start: 2021-07-15 | End: 2021-07-15 | Stop reason: HOSPADM

## 2021-07-15 RX ORDER — ACETAMINOPHEN 500 MG
1000 TABLET ORAL
COMMUNITY
End: 2021-07-16

## 2021-07-15 RX ADMIN — HYDROMORPHONE HYDROCHLORIDE 1 MG: 2 INJECTION, SOLUTION INTRAMUSCULAR; INTRAVENOUS; SUBCUTANEOUS at 08:01

## 2021-07-15 RX ADMIN — DEXAMETHASONE SODIUM PHOSPHATE 4 MG: 4 INJECTION, SOLUTION INTRAMUSCULAR; INTRAVENOUS at 09:34

## 2021-07-15 RX ADMIN — Medication 5 MG: at 09:01

## 2021-07-15 RX ADMIN — ONDANSETRON HYDROCHLORIDE 4 MG: 2 INJECTION INTRAMUSCULAR; INTRAVENOUS at 07:26

## 2021-07-15 RX ADMIN — HYDROMORPHONE HYDROCHLORIDE 0.5 MG: 1 INJECTION, SOLUTION INTRAMUSCULAR; INTRAVENOUS; SUBCUTANEOUS at 10:36

## 2021-07-15 RX ADMIN — ROCURONIUM BROMIDE 50 MG: 10 INJECTION, SOLUTION INTRAVENOUS at 07:31

## 2021-07-15 RX ADMIN — CEFAZOLIN 2 G: 1 INJECTION, POWDER, FOR SOLUTION INTRAVENOUS at 07:35

## 2021-07-15 RX ADMIN — LIDOCAINE HYDROCHLORIDE 80 MG: 20 INJECTION, SOLUTION INTRAVENOUS at 07:29

## 2021-07-15 RX ADMIN — Medication 5 MG: at 08:55

## 2021-07-15 RX ADMIN — PROPOFOL 150 MG: 10 INJECTION, EMULSION INTRAVENOUS at 07:30

## 2021-07-15 RX ADMIN — GLYCOPYRROLATE 0.4 MG: 0.2 INJECTION INTRAMUSCULAR; INTRAVENOUS at 09:57

## 2021-07-15 RX ADMIN — Medication 3 MG: at 09:57

## 2021-07-15 RX ADMIN — FAMOTIDINE 20 MG: 20 TABLET, FILM COATED ORAL at 12:06

## 2021-07-15 RX ADMIN — Medication 10 MG: at 09:08

## 2021-07-15 RX ADMIN — FENTANYL CITRATE 50 MCG: 50 INJECTION, SOLUTION INTRAMUSCULAR; INTRAVENOUS at 07:29

## 2021-07-15 RX ADMIN — OXYCODONE 10 MG: 5 TABLET ORAL at 14:32

## 2021-07-15 RX ADMIN — FENTANYL CITRATE 50 MCG: 50 INJECTION, SOLUTION INTRAMUSCULAR; INTRAVENOUS at 07:55

## 2021-07-15 RX ADMIN — Medication 10 MG: at 09:10

## 2021-07-15 RX ADMIN — MIDAZOLAM 2 MG: 1 INJECTION INTRAMUSCULAR; INTRAVENOUS at 07:23

## 2021-07-15 RX ADMIN — HYDROMORPHONE HYDROCHLORIDE 0.5 MG: 1 INJECTION, SOLUTION INTRAMUSCULAR; INTRAVENOUS; SUBCUTANEOUS at 10:46

## 2021-07-15 RX ADMIN — SODIUM CHLORIDE, SODIUM LACTATE, POTASSIUM CHLORIDE, AND CALCIUM CHLORIDE 70 ML/HR: 600; 310; 30; 20 INJECTION, SOLUTION INTRAVENOUS at 14:36

## 2021-07-15 RX ADMIN — FLUTICASONE PROPIONATE 2 SPRAY: 50 SPRAY, METERED NASAL at 15:05

## 2021-07-15 RX ADMIN — HYDROCHLOROTHIAZIDE 25 MG: 12.5 CAPSULE ORAL at 15:05

## 2021-07-15 RX ADMIN — HYDROMORPHONE HYDROCHLORIDE 0.5 MG: 1 INJECTION, SOLUTION INTRAMUSCULAR; INTRAVENOUS; SUBCUTANEOUS at 10:26

## 2021-07-15 RX ADMIN — ROCURONIUM BROMIDE 20 MG: 10 INJECTION, SOLUTION INTRAVENOUS at 09:16

## 2021-07-15 RX ADMIN — FAMOTIDINE 20 MG: 20 TABLET, FILM COATED ORAL at 21:57

## 2021-07-15 RX ADMIN — OXYCODONE 10 MG: 5 TABLET ORAL at 22:33

## 2021-07-15 RX ADMIN — GLYCOPYRROLATE 0.2 MG: 0.2 INJECTION INTRAMUSCULAR; INTRAVENOUS at 07:26

## 2021-07-15 RX ADMIN — SODIUM CHLORIDE, SODIUM LACTATE, POTASSIUM CHLORIDE, AND CALCIUM CHLORIDE 125 ML/HR: 600; 310; 30; 20 INJECTION, SOLUTION INTRAVENOUS at 06:14

## 2021-07-15 RX ADMIN — CEFAZOLIN 2 G: 10 INJECTION, POWDER, FOR SOLUTION INTRAVENOUS at 21:57

## 2021-07-15 RX ADMIN — KETAMINE HYDROCHLORIDE 20 MG: 10 INJECTION, SOLUTION INTRAMUSCULAR; INTRAVENOUS at 08:31

## 2021-07-15 RX ADMIN — KETAMINE HYDROCHLORIDE 30 MG: 10 INJECTION, SOLUTION INTRAMUSCULAR; INTRAVENOUS at 07:29

## 2021-07-15 RX ADMIN — Medication 10 MG: at 08:58

## 2021-07-15 RX ADMIN — OXYCODONE 10 MG: 5 TABLET ORAL at 18:35

## 2021-07-15 RX ADMIN — FENTANYL CITRATE 50 MCG: 50 INJECTION, SOLUTION INTRAMUSCULAR; INTRAVENOUS at 10:50

## 2021-07-15 RX ADMIN — HYDROMORPHONE HYDROCHLORIDE 4 MG: 2 TABLET ORAL at 12:06

## 2021-07-15 RX ADMIN — CEFAZOLIN 2 G: 10 INJECTION, POWDER, FOR SOLUTION INTRAVENOUS at 14:33

## 2021-07-15 RX ADMIN — SODIUM CHLORIDE, SODIUM LACTATE, POTASSIUM CHLORIDE, AND CALCIUM CHLORIDE: 600; 310; 30; 20 INJECTION, SOLUTION INTRAVENOUS at 08:35

## 2021-07-15 NOTE — INTERVAL H&P NOTE
Update History & Physical    The Patient's History and Physical of July 13,   Chart was reviewed with the patient and I examined the patient. There was no change. The surgical site was confirmed by the patient and me. Plan:  The risk, benefits, expected outcome, and alternative to the recommended procedure have been discussed with the patient. Patient understands and wants to proceed with the procedure.     Electronically signed by Vera Ca MD on 7/15/2021 at 7:18 AM

## 2021-07-15 NOTE — PROGRESS NOTES
Problem: Mobility Impaired (Adult and Pediatric)  Goal: *Acute Goals and Plan of Care (Insert Text)  Description: Physical Therapy short term goals initiated 07/15/21, to be achieved in 2 days. Pt will:   1. Perform bed mobility with indep  in prep for OOB activity. 2. Perform sit <> stand transfers with RW and S in prep for functional mobility and ambulation. 3. Ambulate 150 ft with RW and S in prep for household and community mobility. 4. Ascend/descend 3 stairs with LRAD and S for safe home entry. Note: []  Patient has met MD keyshawn capone for d/c home   []  Recommend HH with 24 hour adult care   [x]  Benefit from additional acute PT session to address:  incr gait distance, stair training    PHYSICAL THERAPY EVALUATION    Patient: Adrian Marcus (89 y.o. female)  Date: 7/15/2021  Primary Diagnosis: Lumbar spinal stenosis [M48.061]  Procedure(s) (LRB):  LUMBAR 4- SACRAL 1 LAMINECTOMY, INSTRUMENTED FUSION WITH CAGES LUMBAR 5- SACRAL 1 AND WITHOUT CAGES LUMBAR 4-5 WITH C-ARM ([place], ASTURA) (N/A) Day of Surgery   Precautions:  Fall, Back, Spinal  PLOF: Pt was independent with household and community mobility with no AD PTA. ASSESSMENT :  Based on the objective data described below, the patient presents with decr balance, low back and B LE pain, and decr activity tolerance resulting in deficits in bed mobility, transfers, and gait quality and tolerance. Pt supine in bed on PT entry, reporting 7/10 pain in back. Pt educated on spinal precautions and able to repeat back precautions. Pt performed supine to sit via log roll technique with SBA and vc for sequencing. Pt donned LSO sitting EOB, pt educated on appropriate use of LSO with OOB mobility. Pt required CGA/SBA for sit <> stand transfers with RW and vc for safe hand placement. Pt ambulated in room with RW and CGA demonstrating decr speed, reciprocal pattern.  Pt declined further gait training in the esparza at this time, and stair negotiation. Pt educated on importance of early mobility, home safety, and need to call for assistance with OOB mobility. Pt left supine in bed with all needs met and all questions answered. Pt would benefit from additional skilled therapy in order to incr functional mobility and promote safe discharge home. Recommend HHPT after discharge     Patient will benefit from skilled intervention to address the above impairments. Patient's rehabilitation potential is considered to be Good  Factors which may influence rehabilitation potential include:   [x]         None noted  []         Mental ability/status  []         Medical condition  []         Home/family situation and support systems  []         Safety awareness  []         Pain tolerance/management  []         Other:      PLAN :  Recommendations and Planned Interventions:   [x]           Bed Mobility Training             []    Neuromuscular Re-Education  [x]           Transfer Training                   []    Orthotic/Prosthetic Training  [x]           Gait Training                          [x]    Modalities  [x]           Therapeutic Exercises           []    Edema Management/Control  [x]           Therapeutic Activities            [x]    Family Training/Education  [x]           Patient Education  []           Other (comment):    Frequency/Duration: Patient will be followed by physical therapy twice daily to address goals. Discharge Recommendations: Home Health  Further Equipment Recommendations for Discharge: N/A     SUBJECTIVE:   Patient stated I feel better than I thought I would.     OBJECTIVE DATA SUMMARY:     Past Medical History:   Diagnosis Date    Arthritis     back    Asthma     seasonal    GERD (gastroesophageal reflux disease)     Hypertension     Hypothyroidism     Nausea & vomiting     Other spondylosis with radiculopathy, lumbar region     UTI (urinary tract infection)      Past Surgical History:   Procedure Laterality Date    HX CERVICAL FUSION 2021    HX CHOLECYSTECTOMY      HX HYSTERECTOMY      HX ORTHOPAEDIC Right 2016    elbow    HX ORTHOPAEDIC Right 2017    toe     Barriers to Learning/Limitations: yes;  other post-anesthesia  Compensate with: Verbal Cues and Tactile Cues  Home Situation:  Home Situation  Home Environment: Private residence  # Steps to Enter: 3  Rails to Enter: No  One/Two Story Residence: Two story, live on 1st floor  Living Alone: No  Support Systems: Spouse/Significant Other/Partner  Patient Expects to be Discharged to[de-identified] [employer]  Current DME Used/Available at Home: Walker  Tub or Shower Type: Shower  Critical Behavior:  Neurologic State: Alert  Orientation Level: Oriented X4  Cognition: Appropriate for age attention/concentration; Follows commands  Safety/Judgement: Fall prevention;Home safety  Psychosocial  Patient Behaviors: Calm; Cooperative  Skin Condition/Temp: Dry;Warm  Skin Integrity: Incision (comment)  Skin Integumentary  Skin Color: Appropriate for ethnicity  Skin Condition/Temp: Dry;Warm  Skin Integrity: Incision (comment)  Strength:    Strength: Generally decreased, functional  Tone & Sensation:   Tone: Normal  Sensation: Intact  Range Of Motion:  AROM: Generally decreased, functional  PROM: Generally decreased, functional  Functional Mobility:  Bed Mobility:  Rolling: Stand-by assistance (vc)  Supine to Sit: Stand-by assistance (vc)  Sit to Supine: Stand-by assistance (vc)  Scooting: Stand-by assistance  Transfers:  Sit to Stand: Contact guard assistance (vc)  Stand to Sit: Contact guard assistance;Stand-by assistance  Balance:   Sitting: Intact  Standing: Intact; With support  Ambulation/Gait Training:  Distance (ft): 40 Feet (ft)  Assistive Device: Gait belt;Walker, rolling  Ambulation - Level of Assistance: Contact guard assistance  Gait Abnormalities: Decreased step clearance  Speed/Naina: Slow  Step Length: Left shortened;Right shortened  Interventions: Safety awareness training; Tactile cues; Verbal cues  Pain:  Pain level pre-treatment: 7/10   Pain level post-treatment: 7/10   Pain Intervention(s) : Medication (see MAR); Rest, Ice, Repositioning  Response to intervention: Nurse notified, See doc flow  Activity Tolerance:   Pt tolerated amb in room with RW and CGA with no LOB or path deviation, pain rated 7/10 t/o session. Please refer to the flowsheet for vital signs taken during this treatment. After treatment:   []         Patient left in no apparent distress sitting up in chair  [x]         Patient left in no apparent distress in bed  [x]         Call bell left within reach  [x]         Nursing notified  []         Caregiver present  []         Bed alarm activated  [x]         SCDs applied    COMMUNICATION/EDUCATION:   [x]         Role of Physical Therapy in the acute care setting. [x]         Fall prevention education was provided and the patient/caregiver indicated understanding. [x]         Patient/family have participated as able in goal setting and plan of care. [x]         Patient/family agree to work toward stated goals and plan of care. []         Patient understands intent and goals of therapy, but is neutral about his/her participation. []         Patient is unable to participate in goal setting/plan of care: ongoing with therapy staff.  []         Other:     Thank you for this referral.  Gumaro Hayes   Time Calculation: 24 mins      Eval Complexity: History: LOW Complexity : Zero comorbidities / personal factors that will impact the outcome / POCExam:LOW Complexity : 1-2 Standardized tests and measures addressing body structure, function, activity limitation and / or participation in recreation  Presentation: LOW Complexity : Stable, uncomplicated  Clinical Decision Making:Low Complexity    Overall Complexity:LOW

## 2021-07-15 NOTE — OP NOTES
OPERATIVE NOTE    Patient: Sandra Wells MRN: [id number]  SSN: [id number]    YOB: 1960  Age: 64 y.o. Sex: female      Indications: This is a 64y.o. year-old female who presents with back and leg pain. She was positive for Instability/DDD/Stenosis. The patient was admitted for surgery as conservative measures have failed. Date of Procedure: 7/15/2021     Preoperative Diagnosis: LUMBAR SPONDYLOSIS WITH RADICULOPATHY AND LUMBOSACRAL SPONDYLOLISTHESIS    Postoperative Diagnosis: LUMBAR SPONDYLOSIS WITH RADICULOPATHY AND LUMBOSACRAL SPONDYLOLISTHESIS      Procedure: L4,L5 and S1 laminectomy for decompression. L4-5 posterolateral and instrumented fusion. L5-S1 posterolateral, interbody and instrumented fusion. Grafting, Fluoro, BMa through separate subfascial incision. Surgeon: Jamal Ramos DO,Surgical Assistant: Sara Obrien PA-C    Assistant: Circ-1: Neri Thakur RN  Circ-2: Hunter Murillo RN  Physician Assistant: Ethan Salmon PA-C  Radiology Technician: Vicki Sahni  Scrub Tech-1: Karen Garcia  Nurse Practitioner: Fran Jimenez NP  Float Staff: Srinivasan Thorne    Anesthesia: Ant Fernandez    Estimated Blood Loss: 250cc    Specimens: * No specimens in log *     Drains: Hemovac    Implants:   Implant Name Type Inv.  Item Serial No.  Lot No. LRB No. Used Action   IFACTOR BONE GRAFT 2.5CC SYRINGE    [employer] [id number] N/A 1 Implanted   GRAFT BONE 2.5ML PUTTY SYR DEMINERLIZED BONE MTRX INJ - [id number]  GRAFT BONE 2.5ML PUTTY SYR DEMINERLIZED BONE MTRX INJ [id number] [employer]WD  N/A 1 Implanted   GRAFT BONE 2.5ML PUTTY SYR DEMINERLIZED BONE MTRX INJ - [id number]  GRAFT BONE 2.5ML PUTTY SYR DEMINERLIZED BONE MTRX INJ [id number] [employer]WD  N/A 1 Implanted   BLOCKER SCREW    [employer] 0 N/A 6 Implanted   SCREW SPNL 6.5X40 MM CORTICAL POLYAX - [id number]  SCREW SPNL 6.5X40 MM CORTICAL POLYAX  [employer]WD 0 N/A 1 Implanted   5.5X35 SCREW    [employer] 0 N/A 1 Implanted   5.5X40 SCREW    [employer] 0 N/A 1 Implanted   7.5X35 SCREW    [employer] 0 N/A 2 Implanted   [id number] DEGREE EXPANDABLE CAGE 9.5-14    [employer] 0 N/A 1 Implanted   6.5X35 SCREW    [employer] 0 N/A 1 Implanted   55MM LILLIAM CURVED    [employer] 0 N/A 2 Implanted       Complications: None; patient tolerated the procedure well. Procedure: The patent was greeted by anesthesia and taken to the operative suite where the patient underwent general endotracheal anesthesia. The patient was positioned in the prone position on a standard radiolucent Hima spine table. A crawford catheter was placed. The lumbar spine was sterilely prepped and draped in the standard fashion. Flouroscopy confirmed the appropriate segments and an incision was made over the posterior spinous processes. The paraspinal musculature was elevated with electrocautery. The upper facet capsule of the surgical levels was left intact and the remaining facet capsules for the surgical levels were removed and the facets decorticated posteriorly. The transverse processes and posterolateral bone was exposed to prepare for posterolateral fusion. Complete or partial spinous process resections were performed at  L-4-S1. A high speed bur was utilized to outline the laminectomy site and a complete decompressive lumbar laminectomy was subsequently performed secondary to spinal stenosis. Complete or partial laminectomy was performed at  L-4-S1. .  Aggressive medial facetectomies were performed at all levels with partial or complete facetectomies as needed to decompress the exiting spinal nerve roots. Foraminotomy was performed over each exiting nerve root bilaterally to assure neurologic decompression. At the completion of the decompression there was no evidence of residual neurological encroachment at any level.   A complete discectomy was subsequently performed for interbody fusion from a trans-foraminal approach at L-5-S1. All disc material was removed. All cartilage was removed from the endplates with disc space rosalba and ring curettes. Four cc's of local bone was packed at the interbody fusion levels as grafting material.  An Titanium expandable half dome-x interbody cage of appropriate length and height was placed in oblique position between the superior and interior endplate of the  A-4-I8.ESXC space for disc height restoration, neural foraminal expansion and lordosis correction as well as interbody fusion. .All of these were accomplished and X-rays confirmed excellent position of the interbody fusion cages without abnormalities. Attention was turned to pedicle screw instrumentation. [place] was utilized to palpate the superior, inferior, and medial wall of each pedicle from the spinal canal.  A high speed bur was utilized to gain lateral entrance into the pedicle and an awl was passed through the pedicle into the vertebral bone. A ball tipped probe was utilized to palpate the internal anatomy of each pedicle including the superior, inferior, medial and lateral internal wall of each pedicle as well as a bony bottom and lateral wall of each vertebrae. An appropriate length and diameter [employer] Midline Cortical pedicle screw was placed and confirmed both radiographicaly and visually to be in excellent position without neurologic encroachment or pedicle wall breach. This was again performed at  L-4-S1. .  A bone marrow aspiration was performed from the right iliac crest with a bone marrow aspiration needle and mixed with I-Factor biological and Osteoselect DBM putty and allowed to sit on the back table for absorption. The transverse processes  and posterolateral bone was decorticated with a high speed bur. The tissues were irrigated with 3000 ml of sterile saline with bacitracin utilizing pulsatile lavage.   Subsequently, the remainder of the patients local bone from decompression was placed between the posterolateral structures for posterolateral fusion between  L-4-S1. The combo grafts were packed as a bone graft extender with the local bone and a large bone graft volume was noted. Pre-bent lordotic rods were placed into the polyaxial heads of the pedicle screw instrumentation and locked into position with locking caps and a torque limiting screwdriver. A secondary evaluation of each neural foramina was performed and no residual or post instrumentation stenosis was noted. All bleeding was cauterized with bipolar electrocautery or gel-foam hemostatic agents. A deep hemovac drain was placed. My Physician Assistant was utilized as a co-surgeon during this case to include dissection, suction, nerve root retraction, Dural retraction, irrigation, graft placement and final closure of surgical incision. This assistance was instrumental to the safety and success of this surgical case. Final X-rays confirmed excellent position of the spinal intsrumentation without abnormality. The fascia was re-approximated with 1-0 Vicryl, the skin edges were re-approximated with 2-0 Vicryl and the skin was closed with a running 3-0 Monocryl. A layer of Dermabond Prineo skin sealant was placed as well as a Mepalex dressing. The patient recovered from anesthesia and was transferred to the post-anesthesia care unit in stable condition.   Jodi Benitez MD  7/15/2021  10:00 AM

## 2021-07-15 NOTE — ANESTHESIA PREPROCEDURE EVALUATION
Relevant Problems   No relevant active problems       Anesthetic History     PONV          Review of Systems / Medical History  Patient summary reviewed, nursing notes reviewed and pertinent labs reviewed    Pulmonary            Asthma   Pertinent negatives: No COPD and sleep apnea     Neuro/Psych   Within defined limits        Pertinent negatives: No seizures and CVA   Cardiovascular    Hypertension            Pertinent negatives: No past MI and CHF  Exercise tolerance: >4 METS     GI/Hepatic/Renal     GERD        Pertinent negatives: No liver disease and renal disease   Endo/Other      Hypothyroidism  Arthritis  Pertinent negatives: No diabetes   Other Findings              Physical Exam    Airway  Mallampati: I  TM Distance: 4 - 6 cm  Neck ROM: normal range of motion   Mouth opening: Normal     Cardiovascular    Rhythm: regular  Rate: normal         Dental  No notable dental hx       Pulmonary  Breath sounds clear to auscultation               Abdominal  GI exam deferred       Other Findings            Anesthetic Plan    ASA: 2  Anesthesia type: general          Induction: Intravenous  Anesthetic plan and risks discussed with: Patient

## 2021-07-15 NOTE — ROUTINE PROCESS
Bedside and Verbal shift change report given to Jesus Yen RN by Nehemiah Obrien RN. Report included the following information SBAR, Kardex, OR Summary, Intake/Output and MAR.

## 2021-07-15 NOTE — PROGRESS NOTES
Transferred patient to [facility] via bed.  VSS     07/15/21 1142   Vital Signs   Temp 97.8 °F (36.6 °C)   Temp Source Axillary   Pulse (Heart Rate) 88   Resp Rate 16   BP (!) 148/82   Oxygen Therapy   O2 Sat (%) 98 %   O2 Device Nasal cannula   O2 Flow Rate (L/min) 3 l/min   Neuro   LUE Motor Response Purposeful   LLE Motor Response Purposeful   RUE Motor Response Purposeful   RLE Motor Response Purposeful

## 2021-07-15 NOTE — NURSE NAVIGATOR
Citlalli Hernandez post spine rounding. Patient educated: Activity:  OOB for all meals, walk every hour to prevent blood clots  Follow back precautions (no bending, twisting, lifting &  Log roll in/out of bed). If your surgeon wants you to wear a back brace. Wear it per your surgeon's instructions. Promoting Circulation:   Use SCD pumps except when walking. Ankle pumps 10 times an hour at hospital & home. Pain Control:  Pain medications side effects discussed. Use ice, distraction, moving, & change position to also help with pain. Narcotics and anesthesia cause constipation so it is important to take stool softener/mild laxative daily while on narcotics. Incentive Spirometry:    Use of incentive spirometer 10 x/hr. Diet:   Eat for healing. Drink enough fluids so urine is the color of lemonade. Medication causes nausea and dizziness if taken on an empty stomach so instructed to make sure to eat a snack before taking any medicaitons. Patient Safety:   Call light & belongings in reach. Call for help when want to walk or get OOB. Citlalli Hernandez verbalized understand. Given the opportunity for asking questions.

## 2021-07-15 NOTE — PERIOP NOTES
Reviewed PTA medication list with patient/caregiver and patient/caregiver denies any additional medications. Patient admits to having a responsible adult care for them at home for at least 24 hours after surgery. Patient encouraged to use gown warming system and informed that using said warming gown to regulate body temperature prior to a procedure has been shown to help reduce the risks of blood clots and infection. Patient's pharmacy of choice verified and documented in PTA medication section. Dual skin assessment & fall risk band verification completed with Beatriz Lyon RN.

## 2021-07-15 NOTE — ANESTHESIA POSTPROCEDURE EVALUATION
Post-Anesthesia Evaluation and Assessment    Cardiovascular Function/Vital Signs  Visit Vitals  /72   Pulse 74   Temp 36.3 °C (97.3 °F)   Resp 14   Ht 5' 6\" (1.676 m)   Wt 77.6 kg (171 lb)   SpO2 100%   BMI 27.60 kg/m²       Patient is status post Procedure(s):  LUMBAR 4- SACRAL 1 LAMINECTOMY, INSTRUMENTED FUSION WITH CAGES LUMBAR 5- SACRAL 1 AND WITHOUT CAGES LUMBAR 4-5 WITH C-ARM ([place], ASTURA). Nausea/Vomiting: Controlled. Postoperative hydration reviewed and adequate. Pain:  Pain Scale 1: Visual (07/15/21 1107)  Pain Intensity 1: 0 (07/15/21 1107)   Managed. Neurological Status:   Neuro (WDL): Within Defined Limits (07/15/21 1045)   At baseline. Mental Status and Level of Consciousness: Baseline and appropriate for discharge. Pulmonary Status:   O2 Device: Nasal cannula (07/15/21 1100)   Adequate oxygenation and airway patent. Complications related to anesthesia: None    Post-anesthesia assessment completed. No concerns. Patient has met all discharge requirements.     Signed By: Hussein Chandler MD    July 15, 2021

## 2021-07-15 NOTE — PERIOP NOTES
TRANSFER - OUT REPORT:    Verbal report given to [facility] RN on Christian Bardales  being transferred to  for routine post - op       Report consisted of patients Situation, Background, Assessment and   Recommendations(SBAR). Information from the following report(s) SBAR, OR Summary and MAR was reviewed with the receiving nurse. Lines:   Peripheral IV 07/15/21 Right Hand (Active)   Site Assessment Clean, dry, & intact 07/15/21 1045   Phlebitis Assessment 0 07/15/21 1045   Infiltration Assessment 0 07/15/21 1045   Dressing Status Clean, dry, & intact 07/15/21 1045   Dressing Type Transparent;Tape 07/15/21 1045   Hub Color/Line Status Pink 07/15/21 1045        Opportunity for questions and clarification was provided.       Patient transported with:   O2 @ 3 liters  Registered Nurse  [facility]

## 2021-07-16 VITALS
HEART RATE: 99 BPM | OXYGEN SATURATION: 99 % | WEIGHT: 171 LBS | RESPIRATION RATE: 16 BRPM | TEMPERATURE: 98.2 F | HEIGHT: 66 IN | DIASTOLIC BLOOD PRESSURE: 71 MMHG | SYSTOLIC BLOOD PRESSURE: 122 MMHG | BODY MASS INDEX: 27.48 KG/M2

## 2021-07-16 LAB
ANION GAP SERPL CALC-SCNC: 9 MMOL/L (ref 3–18)
BUN SERPL-MCNC: 8 MG/DL (ref 7–18)
BUN/CREAT SERPL: 12 (ref 12–20)
CALCIUM SERPL-MCNC: 8.7 MG/DL (ref 8.5–10.1)
CHLORIDE SERPL-SCNC: 100 MMOL/L (ref 100–111)
CO2 SERPL-SCNC: 29 MMOL/L (ref 21–32)
CREAT SERPL-MCNC: 0.66 MG/DL (ref 0.6–1.3)
ERYTHROCYTE [DISTWIDTH] IN BLOOD BY AUTOMATED COUNT: 12.7 % (ref 11.6–14.5)
GLUCOSE SERPL-MCNC: 98 MG/DL (ref 74–99)
HCT VFR BLD AUTO: 41.4 % (ref 35–45)
HGB BLD-MCNC: 13.7 G/DL (ref 12–16)
MCH RBC QN AUTO: 31 PG (ref 24–34)
MCHC RBC AUTO-ENTMCNC: 33.1 G/DL (ref 31–37)
MCV RBC AUTO: 93.7 FL (ref 74–97)
PLATELET # BLD AUTO: 365 K/UL (ref 135–420)
PMV BLD AUTO: 9.2 FL (ref 9.2–11.8)
POTASSIUM SERPL-SCNC: 4.4 MMOL/L (ref 3.5–5.5)
RBC # BLD AUTO: 4.42 M/UL (ref 4.2–5.3)
SODIUM SERPL-SCNC: 138 MMOL/L (ref 136–145)
WBC # BLD AUTO: 18.1 K/UL (ref 4.6–13.2)

## 2021-07-16 PROCEDURE — 85027 COMPLETE CBC AUTOMATED: CPT

## 2021-07-16 PROCEDURE — 74011250637 HC RX REV CODE- 250/637: Performed by: PHYSICIAN ASSISTANT

## 2021-07-16 PROCEDURE — 80048 BASIC METABOLIC PNL TOTAL CA: CPT

## 2021-07-16 PROCEDURE — 36415 COLL VENOUS BLD VENIPUNCTURE: CPT

## 2021-07-16 PROCEDURE — 97530 THERAPEUTIC ACTIVITIES: CPT

## 2021-07-16 PROCEDURE — 74011000250 HC RX REV CODE- 250: Performed by: PHYSICIAN ASSISTANT

## 2021-07-16 PROCEDURE — 97116 GAIT TRAINING THERAPY: CPT

## 2021-07-16 PROCEDURE — 74011250636 HC RX REV CODE- 250/636: Performed by: PHYSICIAN ASSISTANT

## 2021-07-16 PROCEDURE — 97165 OT EVAL LOW COMPLEX 30 MIN: CPT

## 2021-07-16 PROCEDURE — 99218 HC RM OBSERVATION: CPT

## 2021-07-16 PROCEDURE — 97535 SELF CARE MNGMENT TRAINING: CPT

## 2021-07-16 RX ORDER — OXYCODONE HYDROCHLORIDE 5 MG/1
5-10 TABLET ORAL
Qty: 30 TABLET | Refills: 0 | Status: SHIPPED | OUTPATIENT
Start: 2021-07-16 | End: 2021-07-19

## 2021-07-16 RX ORDER — CYCLOBENZAPRINE HCL 10 MG
10 TABLET ORAL
Qty: 40 TABLET | Refills: 1 | Status: SHIPPED | OUTPATIENT
Start: 2021-07-16

## 2021-07-16 RX ADMIN — FLUTICASONE PROPIONATE 2 SPRAY: 50 SPRAY, METERED NASAL at 08:48

## 2021-07-16 RX ADMIN — OXYCODONE 10 MG: 5 TABLET ORAL at 06:39

## 2021-07-16 RX ADMIN — POLYETHYLENE GLYCOL 3350 17 G: 17 POWDER, FOR SOLUTION ORAL at 08:48

## 2021-07-16 RX ADMIN — OXYCODONE 10 MG: 5 TABLET ORAL at 02:40

## 2021-07-16 RX ADMIN — OXYCODONE 10 MG: 5 TABLET ORAL at 10:44

## 2021-07-16 RX ADMIN — HYDROCHLOROTHIAZIDE 25 MG: 12.5 CAPSULE ORAL at 08:48

## 2021-07-16 RX ADMIN — CEFAZOLIN 2 G: 10 INJECTION, POWDER, FOR SOLUTION INTRAVENOUS at 06:39

## 2021-07-16 RX ADMIN — FAMOTIDINE 20 MG: 20 TABLET, FILM COATED ORAL at 08:48

## 2021-07-16 NOTE — PROGRESS NOTES
Transition of Care (FRANCISCO JAVIER) Plan:     Chart reviewed and spoke with pt via phone conversation to discuss d/c planning, FOC explained and offered, pt selected prearrangements with [address], referral placed,address and contact number verified, pt has home rolling walker and assistance from family. FRANCISCO JAVIER Transportation:   How is patient being transported at discharge? Family/Friend      When? Once discharge criteria met     Is transport scheduled? N/A        Follow-up appointment and transportation:   PCP/Specialist?  See AVS for Appointment         Who is transporting to the follow-up appointment? Family/Friend      Is transport for follow up appointment scheduled? N/A    Communication plan (with patient/family): Who is being called? Patient or Next of Kin? Responsible party? Patient      What number(s) is to be used? See Facesheet      What service provider is calling for [facility] services? When are they calling? 24-48 hours following discharge    Readmission Risk? (Green/Low; Yellow/Moderate; Red/High):  Green  Care Management Interventions  PCP Verified by CM: Yes  Palliative Care Criteria Met (RRAT>21 & CHF Dx)?: No  Mode of Transport at Discharge:  Other (see comment) (spouse)  Transition of Care Consult (CM Consult): [address]: No  Reason Outside [place]: Physician referred to specific agency [facility])  Physical Therapy Consult: Yes  Occupational Therapy Consult: Yes  Current Support Network: Lives with Spouse  Confirm Follow Up Transport: Family  The Patient and/or Patient Representative was Provided with a Choice of Provider and Agrees with the Discharge Plan?: Yes  Freedom of Choice List was Provided with Basic Dialogue that Supports the Patient's Individualized Plan of Care/Goals, Treatment Preferences and Shares the Quality Data Associated with the Providers?: Yes  Discharge Location  Discharge Placement: Home with home health

## 2021-07-16 NOTE — PROGRESS NOTES
20:15 Assessment completed. Lungs are clear bilat IS = 1250. Optifoam dsg on back remains C/D/I with CMS & PP intact. Denies numbness or tingling in LE's. Dorsi/plantar flexion & extension remains = & strong. H-vac with serosanguinous draianage. SCD's traded for PP's r/t lymphedema discomfort. Resting quietly in bed with visitors @ bedside. 22:30 Shift assessment completed. See nsg flow sheet for details. 02:50 Reassessed with 0 changes noted. Back dsg remains C/D/I with CMS & PP intact. Resting quietly in bed with eyes closed between cares.

## 2021-07-16 NOTE — PROGRESS NOTES
OCCUPATIONAL THERAPY EVALUATION/DISCHARGE    Patient: Violette Collier (89 y.o. female)  Date: 7/16/2021  Primary Diagnosis: Lumbar spinal stenosis [M48.061]  Procedure(s) (LRB):  LUMBAR 4- SACRAL 1 LAMINECTOMY, INSTRUMENTED FUSION WITH CAGES LUMBAR 5- SACRAL 1 AND WITHOUT CAGES LUMBAR 4-5 WITH C-ARM ([place], ASTURA) (N/A) 1 Day Post-Op   Precautions:   Fall, Back, Spinal  PLOF: independent with ADLs and transfers     ASSESSMENT AND RECOMMENDATIONS:  Based on the objective data described below, the patient presents with requiring S for ADLs and transfers following above mentioned surgical procedure. Pt presented supine in bed at the beginning of session, pain 6/10. Pt able to demo understanding for 3/3 lumbar fusion precautions and log roll technique for bed mobility. Performed LB dressing, toilet transfers, toileting, and grooming with S during this session. Pt was provided with AEs for LB ADLs and pt demo understanding for the same. Pt was left seated at EOB at the end of session in NAD. Skilled occupational therapy is not indicated at this time. Discharge Recommendations: Home Health  Further Equipment Recommendations for Discharge: N/A      SUBJECTIVE:   Patient stated  I am doing better.     OBJECTIVE DATA SUMMARY:     Past Medical History:   Diagnosis Date    Arthritis     back    Asthma     seasonal    GERD (gastroesophageal reflux disease)     Hypertension     Hypothyroidism     Nausea & vomiting     Other spondylosis with radiculopathy, lumbar region     UTI (urinary tract infection)      Past Surgical History:   Procedure Laterality Date    HX CERVICAL FUSION  2021    HX CHOLECYSTECTOMY      HX HYSTERECTOMY      HX ORTHOPAEDIC Right 2016    elbow    HX ORTHOPAEDIC Right 2017    toe     Barriers to Learning/Limitations: None  Compensate with: visual, verbal, tactile, kinesthetic cues/model    Home Situation:   Home Situation  Home Environment: Private residence  # Steps to Enter: 3  Rails to Enter: No  One/Two Story Residence: Two story, live on 1st floor  Lift Chair Available: No  Living Alone: No  Support Systems: Spouse/Significant Other/Partner  Patient Expects to be Discharged to[de-identified] House  Current DME Used/Available at Home: Walker  Tub or Shower Type: Shower  []     Right hand dominant   []     Left hand dominant    Cognitive/Behavioral Status:  Neurologic State: Alert  Orientation Level: Oriented X4  Cognition: Appropriate for age attention/concentration; Follows commands  Safety/Judgement: Fall prevention;Home safety    Skin: intact  Edema: none    Vision/Perceptual:    Tracking: Able to track stimulus in all quadrants w/o difficulty      Coordination: BUE  Coordination: Within functional limits  Fine Motor Skills-Upper: Left Intact; Right Intact    Gross Motor Skills-Upper: Left Intact; Right Intact    Balance:  Sitting: Intact  Standing: Intact; With support    Strength: BUE  Strength: Generally decreased, functional    Tone & Sensation: BUE  Tone: Normal  Sensation: Intact    Range of Motion: BUE  AROM: Generally decreased, functional    Functional Mobility and Transfers for ADLs:  Bed Mobility:  Rolling: Supervision  Supine to Sit: Supervision  Sit to Supine: Supervision  Scooting: Supervision  Transfers:  Sit to Stand: Supervision  Stand to Sit: Supervision   Toilet Transfer : Supervision    ADL Assessment:  Feeding: Supervision    Oral Facial Hygiene/Grooming: Supervision    Upper Body Dressing: Supervision    Lower Body Dressing: Supervision    Toileting: Supervision  ADL Intervention:  Grooming  Grooming Assistance: Supervision  Position Performed: Standing  Washing Face: Supervision  Washing Hands: Supervision  Brushing Teeth: Supervision    Lower Body Dressing Assistance  Dressing Assistance: Stand-by assistance;Supervision  Underpants: Supervision  Pants With Elastic Waist: Supervision;Stand-by assistance  Leg Crossed Method Used: Yes  Position Performed: Seated edge of bed  Cues: Don;Verbal cues provided;Physical assistance    Toileting  Toileting Assistance: Supervision  Bladder Hygiene: Supervision  Clothing Management: Supervision    Cognitive Retraining  Safety/Judgement: Fall prevention;Home safety    Pain:  Pain level pre-treatment: 6/10   Pain level post-treatment: 6/10   Pain Intervention(s): Medication (see MAR); Rest, Ice, Repositioning   Response to intervention: Nurse notified, See doc flow    Activity Tolerance:   Good     Please refer to the flowsheet for vital signs taken during this treatment. After treatment:   [x]  Patient left in no apparent distress sitting up at EOB  []  Patient left in no apparent distress in bed  [x]  Call bell left within reach  [x]  Nursing notified  [x]  Caregiver present  []  Bed alarm activated    COMMUNICATION/EDUCATION:   [x]      Role of Occupational Therapy in the acute care setting  [x]      Home safety education was provided and the patient/caregiver indicated understanding. [x]      Patient/family have participated as able and agree with findings and recommendations. []      Patient is unable to participate in plan of care at this time. Thank you for this referral.  Dilia Lopez OTR/L  Time Calculation: 25 mins      Eval Complexity: History: LOW Complexity : Brief history review ; Examination: LOW Complexity : 1-3 performance deficits relating to physical, cognitive , or psychosocial skils that result in activity limitations and / or participation restrictions ;    Decision Making:LOW Complexity : No comorbidities that affect functional and no verbal or physical assistance needed to complete eval tasks

## 2021-07-16 NOTE — PROGRESS NOTES
Problem: Mobility Impaired (Adult and Pediatric)  Goal: *Acute Goals and Plan of Care (Insert Text)  Description: Physical Therapy short term goals initiated 07/15/21, to be achieved in 2 days. Pt will:   1. Perform bed mobility with indep  in prep for OOB activity. 2. Perform sit <> stand transfers with RW and S in prep for functional mobility and ambulation. 3. Ambulate 150 ft with RW and S in prep for household and community mobility. 4. Ascend/descend 3 stairs with LRAD and S for safe home entry. Outcome: Progressing Towards Goal   [x]  Patient has met MD mobilization liborio for d/c home   []  Recommend HH with 24 hour adult care   []  Benefit from additional acute PT session to address:      PHYSICAL THERAPY TREATMENT    Patient: Codie Garg (14 y.o. female)  Date: 7/16/2021  Diagnosis: Lumbar spinal stenosis [M48.061] Lumbar spinal stenosis  Procedure(s) (LRB):  LUMBAR 4- SACRAL 1 LAMINECTOMY, INSTRUMENTED FUSION WITH CAGES LUMBAR 5- SACRAL 1 AND WITHOUT CAGES LUMBAR 4-5 WITH C-ARM ([place], ASTURA) (N/A) 1 Day Post-Op  Precautions: Fall, Back, Spinal  PLOF: independent, ambulatory without AD, has a RW    ASSESSMENT:  Pt supine in bed upon arrival, performed proper log roll tech without prompting. Donned LSO at EOB with VC. Sit to stand performed with bed in lowest position. Ambulated with RW 180ft, reciprocal gt pattern, steady pace, no LOB or path deviations. Negotiated 2 steps holding (B) hand rails. Returned to room, doffed LSO independently and returned to supine in bed. Progression toward goals:   []      Improving appropriately and progressing toward goals  [x]      Improving slowly and progressing toward goals  []      Not making progress toward goals and plan of care will be adjusted     PLAN:  Patient continues to benefit from skilled intervention to address the above impairments. Continue treatment per established plan of care.   Discharge Recommendations:  Home Health  Further Equipment Recommendations for Discharge:  rolling walker     SUBJECTIVE:   Patient stated It feels better to move.     OBJECTIVE DATA SUMMARY:   Critical Behavior:  Neurologic State: Alert, Eyes open spontaneously  Orientation Level: Oriented X4  Cognition: Appropriate decision making, Appropriate for age attention/concentration, Appropriate safety awareness, Follows commands  Safety/Judgement: Fall prevention, Home safety  Functional Mobility Training:  Bed Mobility:  Rolling: Supervision  Supine to Sit: Supervision  Sit to Supine: Supervision  Scooting: Supervision  Transfers:  Sit to Stand: Supervision  Stand to Sit: Supervision  Balance:  Sitting: Intact  Standing: Intact; With support   Ambulation/Gait Training:  Distance (ft): 180 Feet (ft)  Assistive Device: Gait belt;Walker, rolling  Ambulation - Level of Assistance: Modified independent  Stairs:  Number of Stairs Trained: 2  Stairs - Level of Assistance: Supervision  Rail Use: Both        Pain:  Pain level pre-treatment: 7/10  Pain level post-treatment: 6/10   Pain Intervention(s): Medication (see MAR); Rest, Ice, Repositioning   Response to intervention: Nurse notified, See doc flow    Activity Tolerance:   Good  Please refer to the flowsheet for vital signs taken during this treatment. After treatment:   [] Patient left in no apparent distress sitting up in chair  [x] Patient left in no apparent distress in bed  [x] Call bell left within reach  [x] Nursing notified  [] Caregiver present  [] Bed alarm activated  [x] SCDs applied      COMMUNICATION/EDUCATION:   [x]         Role of Physical Therapy in the acute care setting. [x]         Fall prevention education was provided and the patient/caregiver indicated understanding. [x]         Patient/family have participated as able in working toward goals and plan of care. [x]         Patient/family agree to work toward stated goals and plan of care.   []         Patient understands intent and goals of therapy, but is neutral about his/her participation.   []         Patient is unable to participate in stated goals/plan of care: ongoing with therapy staff.  []         Other:        Celestina Campuzano, CAROLINE   Time Calculation: 13 mins

## 2021-07-16 NOTE — PROGRESS NOTES
Progress Note      Patient: Rosa Peralta               Sex: female          DOA: 7/15/2021         YOB: 1960      Age:  64 y.o.        LOS:  LOS: 0 days     Procedure: Procedure(s):  LUMBAR 4- SACRAL 1 LAMINECTOMY, INSTRUMENTED FUSION WITH CAGES LUMBAR 5- SACRAL 1 AND WITHOUT CAGES LUMBAR 4-5 WITH C-ARM ([place], ASTURA)            Subjective:     Rosa Peralta is a 64 y.o. female who c/o stable, mild-to-moderate joint symptoms intermittently, reasonably well controlled by PRN meds      Objective:      Visit Vitals  /73   Pulse (!) 103   Temp 98.2 °F (36.8 °C)   Resp 16   Ht 5' 6\" (1.676 m)   Wt 77.6 kg (171 lb)   SpO2 97%   Breastfeeding No   BMI 27.60 kg/m²       Physical Exam:  A&O x3  VSS  HF/GS/QUADS/EHL/TA 5/5 bilateral  Calves nontender      Dressing: C/D/I    Intake and Output:  Current Shift:  07/15 1901 - 07/16 0700  In: 2777.7 [P.O.:2000; I.V.:777.7]  Out: 2240 [Urine:2050; Drains:190]  Last three shifts:  07/14 0701 - 07/15 1900  In: 2000 [P.O.:200;  I.V.:1800]  Out: 1550 [Urine:1150; Drains:150]    Drain Output:    Lab/Data Reviewed:  BMP:   Lab Results   Component Value Date/Time     07/16/2021 02:25 AM    K 4.4 07/16/2021 02:25 AM     07/16/2021 02:25 AM    CO2 29 07/16/2021 02:25 AM    AGAP 9 07/16/2021 02:25 AM    GLU 98 07/16/2021 02:25 AM    BUN 8 07/16/2021 02:25 AM    CREA 0.66 07/16/2021 02:25 AM    GFRAA >60 07/16/2021 02:25 AM    GFRNA >60 07/16/2021 02:25 AM     CMP:   Lab Results   Component Value Date/Time     07/16/2021 02:25 AM    K 4.4 07/16/2021 02:25 AM     07/16/2021 02:25 AM    CO2 29 07/16/2021 02:25 AM    AGAP 9 07/16/2021 02:25 AM    GLU 98 07/16/2021 02:25 AM    BUN 8 07/16/2021 02:25 AM    CREA 0.66 07/16/2021 02:25 AM    GFRAA >60 07/16/2021 02:25 AM    GFRNA >60 07/16/2021 02:25 AM    CA 8.7 07/16/2021 02:25 AM       Medications Reviewed    Continued hospitalization is indicated due to therapy needs      Assessment/Plan Principal Problem:    Lumbar spinal stenosis (7/13/2021)    Active Problems:    Radiculopathy of leg (5/3/2021)      Lumbar spondylosis (7/13/2021)        S/p Lumbar fusion doing well  Change dressing, D/c O2  OOB today with PT BID with walker and Brace  D/c drain when less than 50cc, D/c crawford when ambulatory  D/C PCA, D/C IVF  Possible D/C home today if patient clears PT    Home

## 2021-07-16 NOTE — PROGRESS NOTES
Oral and Written notification given to patient and/or caregiver informing them that they are currently an Outpatient receiving care in our facility. Outpatient services include Observation Services under [address] and [facility] requirements.

## 2021-07-16 NOTE — PROGRESS NOTES
Problem: Falls - Risk of  Goal: *Absence of Falls  Description: Document Jared Chavezasin Fall Risk and appropriate interventions in the flowsheet.   Outcome: Progressing Towards Goal  Note: Fall Risk Interventions:  Mobility Interventions: Communicate number of staff needed for ambulation/transfer, Patient to call before getting OOB, Utilize walker, cane, or other assistive device         Medication Interventions: Assess postural VS orthostatic hypotension, Patient to call before getting OOB, Teach patient to arise slowly    Elimination Interventions: Call light in reach, Patient to call for help with toileting needs

## 2021-07-16 NOTE — DISCHARGE SUMMARY
Discharge Summary    Patient: Yimi Hodeg               Sex: female          DOA: 7/15/2021         YOB: 1960      Age:  64 y.o.        LOS:  LOS: 0 days                Admit Date: 7/15/2021    Discharge Date: 7/16/2021    Admission Diagnoses: Lumbar spinal stenosis [M48.061]    Discharge Diagnoses:    Problem List as of 7/16/2021 Date Reviewed: 7/16/2021        Codes Class Noted - Resolved    * (Principal) Lumbar spinal stenosis ICD-10-CM: M48.061  ICD-9-CM: 724.02  7/13/2021 - Present        Lumbar spondylosis ICD-10-CM: M47.816  ICD-9-CM: 721.3  7/13/2021 - Present        Cervical spinal stenosis ICD-10-CM: M48.02  ICD-9-CM: 723.0  5/3/2021 - Present        Cervical spondylosis ICD-10-CM: M47.812  ICD-9-CM: 721.0  5/3/2021 - Present        Radiculopathy of leg ICD-10-CM: M54.10  ICD-9-CM: 724.4  5/3/2021 - Present        Allergic rhinitis ICD-10-CM: J30.9  ICD-9-CM: 477.9  9/15/2020 - Present        Dysfunction of eustachian tube ICD-10-CM: H69.80  ICD-9-CM: 381.81  9/15/2020 - Present        Esophageal reflux ICD-10-CM: K21.9  ICD-9-CM: 530.81  9/15/2020 - Present        High blood pressure ICD-10-CM: I10  ICD-9-CM: 401.9  9/15/2020 - Present        Hypothyroidism ICD-10-CM: E03.9  ICD-9-CM: 244.9  9/15/2020 - Present        Irritable bowel syndrome ICD-10-CM: K58.9  ICD-9-CM: 564.1  9/15/2020 - Present        Symptomatic menopausal or female climacteric states ICD-10-CM: N95.1  ICD-9-CM: 627.2  9/15/2020 - Present        Recurrent UTI ICD-10-CM: N39.0  ICD-9-CM: 599.0  8/21/2018 - Present        Anemia ICD-10-CM: D64.9  ICD-9-CM: 285.9  7/20/2015 - Present        Screen for colon cancer ICD-10-CM: Z12.11  ICD-9-CM: V76.51  7/24/2013 - Present        Hypercholesterolemia ICD-10-CM: E78.00  ICD-9-CM: 272.0  5/17/2012 - Present        Bronchospasm ICD-10-CM: J98.01  ICD-9-CM: 519.11  5/10/2012 - Present        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 She   was alert and oriented times 3. She was neurologically intact in the lower extremities, and calves are non-tender. She was c/o of stable, mild-to-moderate joint symptoms intermittently, reasonably well controlled by current meds . She will begin Physical therapy and will be up and ambulatory with their walker. Their Hemoglobin was stable. Their pain medications were continued for pain control. Later today, if She is thought to be medically and orthopaedically stable then they will be discharged. Consults: None    Significant Diagnostic Studies: none    Discharge Medications:     Current Discharge Medication List      START taking these medications    Details   cyclobenzaprine (FLEXERIL) 10 mg tablet Take 1 Tablet by mouth three (3) times daily as needed for Muscle Spasm(s). Qty: 40 Tablet, Refills: 1      oxyCODONE IR (ROXICODONE) 5 mg immediate release tablet Take 1-2 Tablets by mouth every four (4) hours as needed for Pain for up to 3 days. Max Daily Amount: 60 mg.  Qty: 30 Tablet, Refills: 0    Associated Diagnoses: Radiculopathy of leg         CONTINUE these medications which have NOT CHANGED    Details   famotidine (PEPCID) 40 mg tablet Take 40 mg by mouth every evening. polyethylene glycol 3350 (MIRALAX PO) Take  by mouth daily. azelastine (ASTELIN) 137 mcg (0.1 %) nasal spray 1 [place] by Both Nostrils route two (2) times a day. Use in each nostril as directed      Cetirizine (ZyrTEC) 10 mg cap Take 10 mg by mouth.      progesterone (PROMETRIUM) 200 mg capsule Take 300 mg by mouth every evening. Compound pharmacy dispensed. Dexlansoprazole (DEXILANT) 60 mg CpDB Take 60 mg by mouth daily (with dinner).       hydroCHLOROthiazide (MICROZIDE) 12.5 mg capsule Take 25 mg by mouth daily. thyroid, Pork, (Dante Thyroid) 60 mg tablet Take 60 mg by mouth daily. fluticasone propionate (FLONASE) 50 mcg/actuation nasal spray 2 Sprays by Both Nostrils route daily. multivitamin (ONE A DAY) tablet Take 1 Tab by mouth daily. red yeast rice extract 600 mg cap Take 600 mg by mouth two (2) times a day. melatonin 5 mg cap capsule Take 10 mg by mouth nightly. krill/om-3/dha/epa/phospho/ast (MAXIMUM RED KRILL OMEGA-3 PO) Take  by mouth. cholecalciferol (Vitamin D3) 25 mcg (1,000 unit) cap Take 1,000 Units by mouth daily. calcium carbonate (CALCIUM 300 PO) Take  by mouth. Biotin 2,500 mcg cap Take  by mouth daily. ascorbic acid, vitamin C, (Vitamin C) 250 mg tablet Take 250 mg by mouth.      cyanocobalamin (Vitamin B-12) 100 mcg tablet Take 100 mcg by mouth daily. vit B Cmplx 3-FA-Vit C-Biotin (NEPHRO ALPHONSO RX) 1- mg-mg-mcg tablet Take 1 Tab by mouth daily. albuterol (PROVENTIL HFA, VENTOLIN HFA, PROAIR HFA) 90 mcg/actuation inhaler Take 1 Puff by inhalation every six (6) hours as needed for Wheezing. estrogens, conjugated (CONJUGATED ESTROGENS INJECTION) by Injection route. Injections q12 weeks. cranberry extract 450 mg tab tablet Take 450 mg by mouth. STOP taking these medications       acetaminophen (TYLENOL) 500 mg tablet Comments:   Reason for Stopping:         methocarbamoL (ROBAXIN) 750 mg tablet Comments:   Reason for Stopping:               Activity: No lifting, driving or strenuous activity for 2 weeks     Diet:  Regular Diet    Wound Care: Keep wound clean and dry. Change dressing as needed. Follow-up: Pt should follow-up in the office in 10 days - 2 weeks. They should not get their incision wet until they see us back. The patient will have home health care since they are home bound after their recent surgery. They will have dressing changes and physical therapy.   With physical therapy, they should be up and ambulatory weight bear as tolerated. They will be sent home on oxycodone and flexaril. She will stop all tobacco or anti-inflammatory medications. They will also take all of the medications on their discharge medical reconciliation form. They should avoid any lifting, anti-inflammatories or tobacco use. They will call with any and all concerns. Their medications are on the chart.

## 2021-07-16 NOTE — PROGRESS NOTES
Problem: Falls - Risk of  Goal: *Absence of Falls  Description: Document Camryn Vazquez Fall Risk and appropriate interventions in the flowsheet.   Outcome: Resolved/Met  Note: Fall Risk Interventions:  Mobility Interventions: Patient to call before getting OOB, Utilize walker, cane, or other assistive device         Medication Interventions: Patient to call before getting OOB, Teach patient to arise slowly    Elimination Interventions: Call light in reach, Patient to call for help with toileting needs              Problem: Patient Education: Go to Patient Education Activity  Goal: Patient/Family Education  Outcome: Resolved/Met     Problem: Patient Education: Go to Patient Education Activity  Goal: Patient/Family Education  Outcome: Resolved/Met     Problem: Patient Education: Go to Patient Education Activity  Goal: Patient/Family Education  Outcome: Resolved/Met     Problem: Complex Spine Procedure:  Day of Surgery  Goal: Activity/Safety  Outcome: Resolved/Met  Goal: Consults, if ordered  Outcome: Resolved/Met  Goal: Diagnostic Test/Procedures  Outcome: Resolved/Met  Goal: Nutrition/Diet  Outcome: Resolved/Met  Goal: Discharge Planning  Outcome: Resolved/Met  Goal: Medications  Outcome: Resolved/Met  Goal: Respiratory  Outcome: Resolved/Met  Goal: Treatments/Interventions/Procedures  Outcome: Resolved/Met  Goal: Psychosocial  Outcome: Resolved/Met  Goal: *Optimal pain control at patient's stated goal  Outcome: Resolved/Met  Goal: *Demonstrates progressive activity  Outcome: Resolved/Met  Goal: *Respiratory status stable  Outcome: Resolved/Met     Problem: Complex Spine Procedure:  Post Op Day 1  Goal: Off Pathway (Use only if patient is Off Pathway)  Outcome: Resolved/Met  Goal: Activity/Safety  Outcome: Resolved/Met  Goal: Consults, if ordered  Outcome: Resolved/Met  Goal: Diagnostic Test/Procedures  Outcome: Resolved/Met  Goal: Nutrition/Diet  Outcome: Resolved/Met  Goal: Discharge Planning  Outcome: Resolved/Met  Goal: Medications  Outcome: Resolved/Met  Goal: Respiratory  Outcome: Resolved/Met  Goal: Treatments/Interventions/Procedures  Outcome: Resolved/Met  Goal: Psychosocial  Outcome: Resolved/Met  Goal: *Progress independence mobility/activities (eg: Mobility precautions)  Outcome: Resolved/Met  Goal: *Verbalizes/demonstrates understanding of proper body mechanics and use of stabilization device if ordered  Outcome: Resolved/Met  Goal: *Optimal pain control at patient's stated goal  Outcome: Resolved/Met  Goal: *Resumes normal function of bladder and bowel  Outcome: Resolved/Met  Goal: *Hemodynamically stable  Outcome: Resolved/Met  Goal: *Tolerating diet  Outcome: Resolved/Met  Goal: *Labs within defined limits  Outcome: Resolved/Met     Problem: Complex Spine Procedure:  Post Op Day 2  Goal: Off Pathway (Use only if patient is Off Pathway)  Outcome: Resolved/Met  Goal: Activity/Safety  Outcome: Resolved/Met  Goal: Diagnostic Test/Procedures  Outcome: Resolved/Met  Goal: Nutrition/Diet  Outcome: Resolved/Met  Goal: Discharge Planning  Outcome: Resolved/Met  Goal: Medications  Outcome: Resolved/Met  Goal: Respiratory  Outcome: Resolved/Met  Goal: Treatments/Interventions/Procedures  Outcome: Resolved/Met  Goal: Psychosocial  Outcome: Resolved/Met  Goal: *Progress independence mobility/activities (eg: Mobility precautions)  Outcome: Resolved/Met  Goal: *Verbalizes/demonstrates understanding of proper body mechanics and use of stabilization device if ordered  Outcome: Resolved/Met  Goal: *Optimal pain control at patient's stated goal  Outcome: Resolved/Met  Goal: *Resumes normal function of bladder and bowel  Outcome: Resolved/Met  Goal: *Hemodynamically stable  Outcome: Resolved/Met  Goal: *Tolerating diet  Outcome: Resolved/Met  Goal: *Labs within defined limits  Outcome: Resolved/Met  Goal: *Able to place stabilization device  Outcome: Resolved/Met     Problem: Complex Spine Procedure:  Post Op Day 3  Goal: Off Pathway (Use only if patient is Off Pathway)  Outcome: Resolved/Met  Goal: Activity/Safety  Outcome: Resolved/Met  Goal: Nutrition/Diet  Outcome: Resolved/Met  Goal: Discharge Planning  Outcome: Resolved/Met  Goal: Medications  Outcome: Resolved/Met  Goal: Respiratory  Outcome: Resolved/Met  Goal: Treatments/Interventions/Procedures  Outcome: Resolved/Met  Goal: Psychosocial  Outcome: Resolved/Met     Problem: Complex Spine Procedure:  Discharge Outcomes  Goal: *Optimal pain control at patient's stated goal  Outcome: Resolved/Met  Goal: *Demonstrates ability to place and remove stabilization device  Outcome: Resolved/Met  Goal: *Progress independence mobility/activities (eg: Mobility precautions)  Outcome: Resolved/Met  Goal: *Resumes normal function of bladder and bowel  Outcome: Resolved/Met  Goal: *Lungs clear or at baseline  Outcome: Resolved/Met  Goal: *Verbalizes name, dosage, time, side effects, and number of days to continue medications  Outcome: Resolved/Met  Goal: *Modified independence with transfers, ambulation on levels with assistance devices, stair climbing, ADL's  Outcome: Resolved/Met  Goal: *Describes follow-up/return visits to physicians  Outcome: Resolved/Met  Goal: *Describes available resources and support systems  Outcome: Resolved/Met  Goal: *Labs within defined limits  Outcome: Resolved/Met  Goal: *Tolerating diet  Outcome: Resolved/Met

## 2021-07-16 NOTE — PROGRESS NOTES
2629 - Bedside shift report received from BETTE Hatch RN. Assumed care of patient. Patient noted resting in bed at this time. Call light in reach. 8209 - Assessment complete. Patient alert and oriented x 4. Cap refills < 3 sec. Pulses palpable and equal bilaterally. Lung sounds clear bilaterally. Respirations even and unlabored. Abd soft and nontender. Bowel sounds active to all 4 quads. Voiding without difficulty. Skin warm and dry. Drsg to lower back noted CDI. Hemovac drain intact and draining. Back brace in place. IV to right hand, site CDI. Jean hose to BLE. Plexi's to bilateral feet. Reports pain 7/10. PRN pain medication administered. Patient resting in bed with call light in reach. 1044 - PRN oxycodone administered for 7/10 pain.

## 2022-03-18 PROBLEM — M54.10 RADICULOPATHY OF LEG: Status: ACTIVE | Noted: 2021-05-03

## 2022-03-18 PROBLEM — M47.816 LUMBAR SPONDYLOSIS: Status: ACTIVE | Noted: 2021-07-13

## 2022-03-19 PROBLEM — M48.02 CERVICAL SPINAL STENOSIS: Status: ACTIVE | Noted: 2021-05-03

## 2022-03-19 PROBLEM — H69.80 DYSFUNCTION OF EUSTACHIAN TUBE: Status: ACTIVE | Noted: 2020-09-15

## 2022-03-19 PROBLEM — I10 HIGH BLOOD PRESSURE: Status: ACTIVE | Noted: 2020-09-15

## 2022-03-19 PROBLEM — N39.0 RECURRENT UTI: Status: ACTIVE | Noted: 2018-08-21

## 2022-03-19 PROBLEM — K58.9 IRRITABLE BOWEL SYNDROME: Status: ACTIVE | Noted: 2020-09-15

## 2022-03-19 PROBLEM — M47.812 CERVICAL SPONDYLOSIS: Status: ACTIVE | Noted: 2021-05-03

## 2022-03-19 PROBLEM — J30.9 ALLERGIC RHINITIS: Status: ACTIVE | Noted: 2020-09-15

## 2022-03-19 PROBLEM — M48.061 LUMBAR SPINAL STENOSIS: Status: ACTIVE | Noted: 2021-07-13

## 2022-03-19 PROBLEM — H69.90 DYSFUNCTION OF EUSTACHIAN TUBE: Status: ACTIVE | Noted: 2020-09-15

## 2022-03-19 PROBLEM — K21.9 ESOPHAGEAL REFLUX: Status: ACTIVE | Noted: 2020-09-15

## 2022-03-19 PROBLEM — N95.1 SYMPTOMATIC MENOPAUSAL OR FEMALE CLIMACTERIC STATES: Status: ACTIVE | Noted: 2020-09-15

## 2022-03-19 PROBLEM — E03.9 HYPOTHYROIDISM: Status: ACTIVE | Noted: 2020-09-15

## 2022-08-04 ENCOUNTER — HOSPITAL ENCOUNTER (OUTPATIENT)
Dept: NON INVASIVE DIAGNOSTICS | Age: 62
Discharge: HOME | End: 2022-08-04

## 2022-08-04 ENCOUNTER — HOSPITAL ENCOUNTER (OUTPATIENT)
Dept: LAB | Age: 62
Discharge: HOME | End: 2022-08-04

## 2022-08-04 LAB — SENTARA SPECIMEN COL,SENBCF: NORMAL

## 2022-08-04 PROCEDURE — 93005 ELECTROCARDIOGRAM TRACING: CPT

## 2022-08-04 PROCEDURE — 99001 SPECIMEN HANDLING PT-LAB: CPT

## 2022-08-06 LAB
ATRIAL RATE: 91 BPM
CALCULATED P AXIS, ECG09: 72 DEGREES
CALCULATED R AXIS, ECG10: 34 DEGREES
CALCULATED T AXIS, ECG11: 74 DEGREES
DIAGNOSIS, 93000: NORMAL
P-R INTERVAL, ECG05: 160 MS
Q-T INTERVAL, ECG07: 344 MS
QRS DURATION, ECG06: 70 MS
QTC CALCULATION (BEZET), ECG08: 423 MS
VENTRICULAR RATE, ECG03: 91 BPM

## 2023-05-17 RX ORDER — FLUTICASONE PROPIONATE 50 MCG
2 SPRAY, SUSPENSION (ML) NASAL DAILY
COMMUNITY

## 2023-05-17 RX ORDER — CETIRIZINE HYDROCHLORIDE 10 MG/1
10 CAPSULE, LIQUID FILLED ORAL
COMMUNITY

## 2023-05-17 RX ORDER — PROGESTERONE 200 MG/1
300 CAPSULE ORAL EVERY EVENING
COMMUNITY

## 2023-05-17 RX ORDER — AZELASTINE 1 MG/ML
1 SPRAY, METERED NASAL 2 TIMES DAILY
COMMUNITY

## 2023-05-17 RX ORDER — FAMOTIDINE 40 MG/1
40 TABLET, FILM COATED ORAL EVERY EVENING
COMMUNITY

## 2023-05-17 RX ORDER — LEVOTHYROXINE AND LIOTHYRONINE 38; 9 UG/1; UG/1
60 TABLET ORAL DAILY
COMMUNITY

## 2023-05-17 RX ORDER — DEXLANSOPRAZOLE 60 MG/1
60 CAPSULE, DELAYED RELEASE ORAL
COMMUNITY

## 2023-05-17 RX ORDER — CYCLOBENZAPRINE HCL 10 MG
10 TABLET ORAL 3 TIMES DAILY PRN
COMMUNITY
Start: 2021-07-16

## 2023-05-17 RX ORDER — HYDROCHLOROTHIAZIDE 12.5 MG/1
25 CAPSULE, GELATIN COATED ORAL DAILY
COMMUNITY

## 2023-05-17 RX ORDER — CRANBERRY FRUIT EXTRACT 200 MG
600 CAPSULE ORAL 2 TIMES DAILY
COMMUNITY

## 2023-05-17 RX ORDER — ALBUTEROL SULFATE 90 UG/1
1 AEROSOL, METERED RESPIRATORY (INHALATION) EVERY 6 HOURS PRN
COMMUNITY

## 2023-05-17 RX ORDER — ASCORBIC ACID 250 MG
250 TABLET ORAL
COMMUNITY

## 2023-06-30 ENCOUNTER — APPOINTMENT (OUTPATIENT)
Facility: HOSPITAL | Age: 63
End: 2023-06-30
Payer: COMMERCIAL

## 2023-06-30 ENCOUNTER — HOSPITAL ENCOUNTER (EMERGENCY)
Facility: HOSPITAL | Age: 63
Discharge: HOME | End: 2023-06-30
Attending: EMERGENCY MEDICINE
Payer: COMMERCIAL

## 2023-06-30 VITALS
HEIGHT: 66 IN | SYSTOLIC BLOOD PRESSURE: 137 MMHG | WEIGHT: 162 LBS | RESPIRATION RATE: 18 BRPM | DIASTOLIC BLOOD PRESSURE: 88 MMHG | HEART RATE: 70 BPM | OXYGEN SATURATION: 98 % | BODY MASS INDEX: 26.03 KG/M2 | TEMPERATURE: 97.9 F

## 2023-06-30 DIAGNOSIS — R07.89 ATYPICAL CHEST PAIN: Primary | ICD-10-CM

## 2023-06-30 DIAGNOSIS — R07.89 CHEST TIGHTNESS: ICD-10-CM

## 2023-06-30 DIAGNOSIS — J45.21 MILD INTERMITTENT ACUTE ASTHMATIC BRONCHITIS: ICD-10-CM

## 2023-06-30 LAB
ALBUMIN SERPL-MCNC: 3.7 G/DL (ref 3.4–5)
ALBUMIN/GLOB SERPL: 1 (ref 0.8–1.7)
ALP SERPL-CCNC: 117 U/L (ref 45–117)
ALT SERPL-CCNC: 34 U/L (ref 13–56)
ANION GAP SERPL CALC-SCNC: 4 MMOL/L (ref 3–18)
AST SERPL-CCNC: 17 U/L (ref 10–38)
BASOPHILS # BLD: 0 K/UL (ref 0–0.1)
BASOPHILS NFR BLD: 0 % (ref 0–2)
BILIRUB SERPL-MCNC: 0.4 MG/DL (ref 0.2–1)
BUN SERPL-MCNC: 13 MG/DL (ref 7–18)
BUN/CREAT SERPL: 17 (ref 12–20)
CALCIUM SERPL-MCNC: 9.3 MG/DL (ref 8.5–10.1)
CHLORIDE SERPL-SCNC: 102 MMOL/L (ref 100–111)
CO2 SERPL-SCNC: 32 MMOL/L (ref 21–32)
CREAT SERPL-MCNC: 0.76 MG/DL (ref 0.6–1.3)
D DIMER PPP FEU-MCNC: 0.27 UG/ML(FEU)
DIFFERENTIAL METHOD BLD: ABNORMAL
EKG ATRIAL RATE: 71 BPM
EKG DIAGNOSIS: NORMAL
EKG P AXIS: 62 DEGREES
EKG P-R INTERVAL: 170 MS
EKG Q-T INTERVAL: 372 MS
EKG QRS DURATION: 72 MS
EKG QTC CALCULATION (BAZETT): 404 MS
EKG R AXIS: 15 DEGREES
EKG T AXIS: 51 DEGREES
EKG VENTRICULAR RATE: 71 BPM
EOSINOPHIL # BLD: 0 K/UL (ref 0–0.4)
EOSINOPHIL NFR BLD: 1 % (ref 0–5)
ERYTHROCYTE [DISTWIDTH] IN BLOOD BY AUTOMATED COUNT: 12.7 % (ref 11.6–14.5)
FLUAV RNA SPEC QL NAA+PROBE: NOT DETECTED
FLUBV RNA SPEC QL NAA+PROBE: NOT DETECTED
GLOBULIN SER CALC-MCNC: 3.7 G/DL (ref 2–4)
GLUCOSE SERPL-MCNC: 95 MG/DL (ref 74–99)
HCT VFR BLD AUTO: 47.4 % (ref 35–45)
HGB BLD-MCNC: 16 G/DL (ref 12–16)
IMM GRANULOCYTES # BLD AUTO: 0 K/UL (ref 0–0.04)
IMM GRANULOCYTES NFR BLD AUTO: 0 % (ref 0–0.5)
LYMPHOCYTES # BLD: 1.2 K/UL (ref 0.9–3.6)
LYMPHOCYTES NFR BLD: 26 % (ref 21–52)
MCH RBC QN AUTO: 30.7 PG (ref 24–34)
MCHC RBC AUTO-ENTMCNC: 33.8 G/DL (ref 31–37)
MCV RBC AUTO: 91 FL (ref 78–100)
MONOCYTES # BLD: 0.6 K/UL (ref 0.05–1.2)
MONOCYTES NFR BLD: 13 % (ref 3–10)
NEUTS BAND NFR BLD MANUAL: 8 %
NEUTS SEG # BLD: 3 K/UL (ref 1.8–8)
NEUTS SEG NFR BLD: 52 % (ref 40–73)
NRBC # BLD: 0 K/UL (ref 0–0.01)
NRBC BLD-RTO: 0 PER 100 WBC
PLATELET # BLD AUTO: 297 K/UL (ref 135–420)
PLATELET COMMENT: ABNORMAL
PMV BLD AUTO: 10.1 FL (ref 9.2–11.8)
POTASSIUM SERPL-SCNC: 3.4 MMOL/L (ref 3.5–5.5)
PROT SERPL-MCNC: 7.4 G/DL (ref 6.4–8.2)
RBC # BLD AUTO: 5.21 M/UL (ref 4.2–5.3)
RBC MORPH BLD: ABNORMAL
SARS-COV-2 RNA RESP QL NAA+PROBE: NOT DETECTED
SODIUM SERPL-SCNC: 138 MMOL/L (ref 136–145)
TROPONIN I SERPL HS-MCNC: 4 NG/L (ref 0–54)
WBC # BLD AUTO: 4.8 K/UL (ref 4.6–13.2)

## 2023-06-30 PROCEDURE — 6360000002 HC RX W HCPCS: Performed by: EMERGENCY MEDICINE

## 2023-06-30 PROCEDURE — 6370000000 HC RX 637 (ALT 250 FOR IP): Performed by: EMERGENCY MEDICINE

## 2023-06-30 PROCEDURE — 80053 COMPREHEN METABOLIC PANEL: CPT

## 2023-06-30 PROCEDURE — 84484 ASSAY OF TROPONIN QUANT: CPT

## 2023-06-30 PROCEDURE — 99285 EMERGENCY DEPT VISIT HI MDM: CPT

## 2023-06-30 PROCEDURE — 94640 AIRWAY INHALATION TREATMENT: CPT

## 2023-06-30 PROCEDURE — 85025 COMPLETE CBC W/AUTO DIFF WBC: CPT

## 2023-06-30 PROCEDURE — 71045 X-RAY EXAM CHEST 1 VIEW: CPT

## 2023-06-30 PROCEDURE — 85379 FIBRIN DEGRADATION QUANT: CPT

## 2023-06-30 PROCEDURE — 93005 ELECTROCARDIOGRAM TRACING: CPT | Performed by: STUDENT IN AN ORGANIZED HEALTH CARE EDUCATION/TRAINING PROGRAM

## 2023-06-30 PROCEDURE — 87636 SARSCOV2 & INF A&B AMP PRB: CPT

## 2023-06-30 RX ORDER — IPRATROPIUM BROMIDE AND ALBUTEROL SULFATE 2.5; .5 MG/3ML; MG/3ML
1 SOLUTION RESPIRATORY (INHALATION) EVERY 4 HOURS PRN
Qty: 30 EACH | Refills: 1 | Status: SHIPPED | OUTPATIENT
Start: 2023-06-30

## 2023-06-30 RX ORDER — POTASSIUM CHLORIDE 20 MEQ/1
20 TABLET, EXTENDED RELEASE ORAL
Status: COMPLETED | OUTPATIENT
Start: 2023-06-30 | End: 2023-06-30

## 2023-06-30 RX ORDER — ALBUTEROL SULFATE 2.5 MG/3ML
2.5 SOLUTION RESPIRATORY (INHALATION)
Status: COMPLETED | OUTPATIENT
Start: 2023-06-30 | End: 2023-06-30

## 2023-06-30 RX ORDER — PREDNISONE 50 MG/1
50 TABLET ORAL DAILY
Qty: 5 TABLET | Refills: 0 | Status: SHIPPED | OUTPATIENT
Start: 2023-06-30 | End: 2023-07-05

## 2023-06-30 RX ORDER — ALBUTEROL SULFATE 90 UG/1
2 AEROSOL, METERED RESPIRATORY (INHALATION) EVERY 6 HOURS PRN
Qty: 18 G | Refills: 1 | Status: SHIPPED | OUTPATIENT
Start: 2023-06-30

## 2023-06-30 RX ADMIN — POTASSIUM CHLORIDE 20 MEQ: 1500 TABLET, EXTENDED RELEASE ORAL at 23:44

## 2023-06-30 RX ADMIN — ALBUTEROL SULFATE 2.5 MG: 2.5 SOLUTION RESPIRATORY (INHALATION) at 21:59

## 2024-07-02 ENCOUNTER — TRANSCRIBE ORDERS (OUTPATIENT)
Facility: HOSPITAL | Age: 64
End: 2024-07-02

## 2024-07-02 ENCOUNTER — HOSPITAL ENCOUNTER (OUTPATIENT)
Facility: HOSPITAL | Age: 64
Discharge: HOME | End: 2024-07-05

## 2024-07-02 ENCOUNTER — HOSPITAL ENCOUNTER (OUTPATIENT)
Facility: HOSPITAL | Age: 64
Discharge: HOME | End: 2024-07-04
Payer: COMMERCIAL

## 2024-07-02 DIAGNOSIS — M75.42 IMPINGEMENT SYNDROME OF LEFT SHOULDER: ICD-10-CM

## 2024-07-02 DIAGNOSIS — M75.42 IMPINGEMENT SYNDROME OF LEFT SHOULDER: Primary | ICD-10-CM

## 2024-07-02 LAB
EKG ATRIAL RATE: 89 BPM
EKG DIAGNOSIS: NORMAL
EKG P AXIS: 83 DEGREES
EKG P-R INTERVAL: 154 MS
EKG Q-T INTERVAL: 360 MS
EKG QRS DURATION: 80 MS
EKG QTC CALCULATION (BAZETT): 438 MS
EKG R AXIS: 56 DEGREES
EKG T AXIS: 78 DEGREES
EKG VENTRICULAR RATE: 89 BPM
SENTARA SPECIMEN COLLECTION: NORMAL

## 2024-07-02 PROCEDURE — 93005 ELECTROCARDIOGRAM TRACING: CPT

## 2024-07-02 PROCEDURE — 93010 ELECTROCARDIOGRAM REPORT: CPT | Performed by: INTERNAL MEDICINE

## 2024-07-02 PROCEDURE — 99001 SPECIMEN HANDLING PT-LAB: CPT

## 2024-09-03 ENCOUNTER — HOSPITAL ENCOUNTER (OUTPATIENT)
Facility: HOSPITAL | Age: 64
Setting detail: RECURRING SERIES
Discharge: HOME | End: 2024-09-06
Payer: COMMERCIAL

## 2024-09-03 NOTE — PROGRESS NOTES
PT DAILY TREATMENT NOTE/SHOULDER EVAL 10-18      Patient Name: Kayli Bobby    Date: 9/3/2024    : 1960  Insurance: Payor: CALVIN / Plan: CALVIN DIRECT RICHARD / Product Type: *No Product type* /      Patient  verified yes     Visit #   Current / Total 1 24   Time   In / Out 945 1020     TREATMENT AREA =  Pain in right shoulder [M25.511]      SUBJECTIVE  Pain Level (0-10 scale): 4-5/10  []constant []intermittent []improving []worsening []no change since onset    Any medication changes, allergies to medications, adverse drug reactions, diagnosis change, or new procedure performed?: [x] No    [] Yes   Subjective functional status/changes:     PLOF: functionally independent, no AD, active lifestyle  Limitations to PLOF: arm in sling, difficulty sleeping   Mechanism of Injury: s/p arthroscopic subacromial decompression, rotator cuff repair, long head bicep tenodesis- 8/15/2024   Current symptoms/Complaints: 4/10 at best with in recliner; 10/10 at worst trying to sleep; pt reports they are not able to sleep through the night secondary to pain  Previous Treatment/Compliance: none  PMHx/Surgical Hx: cervical fusion, lumbar fusion, carpal tunnel surgery right , partial hysterectomy, rotator cuff repair, HTN, hypothyroid  Work Hx: retired  Living Situation: lives  - two level house but bedroom is on the first floor  Pt Goals: \"to have better motion and be painfree\"  Barriers: []pain []financial []time []transportation []other  Motivation: very  Substance use: [x]Alcohol []Tobacco []other:   Cognition: A & O x 3        OBJECTIVE      35 min []Eval - untimed                            Therapeutic Procedures:  Tx Min Billable or 1:1 Min (if diff from Tx Min) Procedure, Rationale, Specifics         Details if applicable:             Details if applicable:            Details if applicable:           Details if applicable:            Details if applicable:     0  MC BC Totals Reminder: bill using total

## 2024-09-03 NOTE — PROGRESS NOTES
[facility]  [address] [place] [address]  Ph [phone]  Fx [phone]    Plan of Care/ Statement of Necessity for Physical Therapy Services      Patient name: Kayli Bobby Start of Care: 9/3/2024   Referral source: Enrrique Morataya III, MD : 1960    Medical Diagnosis: Pain in right shoulder [M25.511]   Onset Date:8/15/2024    Treatment Diagnosis: Pain in right shoulder [M25.511]   Prior Hospitalization: see medical history Provider#: [id number]   Medications: Verified on Patient summary List     Comorbidities: cervical fusion, lumbar fusion, carpal tunnel surgery right , partial hysterectomy, HTN, hypothyroid     Prior Level of Function: functionally independent, no AD, active lifestyle       The Plan of Care and following information is based on the information from the initial evaluation.  Assessment/ key information: Patient is a 65 yo female who [facility] to [facility] with c/o left and right Shoulder pain. Patient is s/p Arthroscopic subarcromial decompression, arthroscopic decompression clavicular excision, rotator cuff repair, long head bicep tenodesis,  on 8/15/2024.  She also has a partial rotator cuff tear of left subscapularis. Patient also has a diagnosis of bursitis of her right shoulder.Patient reports difficulty sleeping.  She reports being compliant with sling use. Patient demonstrates decreased ROM, decreased strength, impaired posture, pain and decreased functional mobility tolerance.     Patient will continue to benefit from skilled PT services to modify and progress therapeutic interventions, address functional mobility deficits, address ROM deficits, address strength deficits, analyze and address soft tissue restrictions, analyze and cue movement patterns, analyze and modify body mechanics/ergonomics, and assess and modify postural abnormalities to attain remaining goals.    Evaluation Complexity HistoryHIGH Complexity :3+ comorbidities /

## 2024-09-05 ENCOUNTER — HOSPITAL ENCOUNTER (OUTPATIENT)
Facility: HOSPITAL | Age: 64
Setting detail: RECURRING SERIES
Discharge: HOME | End: 2024-09-08
Payer: COMMERCIAL

## 2024-09-05 PROCEDURE — 97110 THERAPEUTIC EXERCISES: CPT

## 2024-09-05 PROCEDURE — 97016 VASOPNEUMATIC DEVICE THERAPY: CPT

## 2024-09-05 PROCEDURE — 97535 SELF CARE MNGMENT TRAINING: CPT

## 2024-09-10 ENCOUNTER — HOSPITAL ENCOUNTER (OUTPATIENT)
Facility: HOSPITAL | Age: 64
Setting detail: RECURRING SERIES
Discharge: HOME | End: 2024-09-13
Payer: COMMERCIAL

## 2024-09-10 PROCEDURE — 97140 MANUAL THERAPY 1/> REGIONS: CPT

## 2024-09-10 PROCEDURE — 97112 NEUROMUSCULAR REEDUCATION: CPT

## 2024-09-10 PROCEDURE — 97016 VASOPNEUMATIC DEVICE THERAPY: CPT

## 2024-09-10 PROCEDURE — 97110 THERAPEUTIC EXERCISES: CPT

## 2024-09-12 ENCOUNTER — HOSPITAL ENCOUNTER (OUTPATIENT)
Facility: HOSPITAL | Age: 64
Setting detail: RECURRING SERIES
Discharge: HOME | End: 2024-09-15
Payer: COMMERCIAL

## 2024-09-12 PROCEDURE — 97110 THERAPEUTIC EXERCISES: CPT

## 2024-09-12 PROCEDURE — 97112 NEUROMUSCULAR REEDUCATION: CPT

## 2024-09-12 PROCEDURE — 97016 VASOPNEUMATIC DEVICE THERAPY: CPT

## 2024-09-12 PROCEDURE — 97535 SELF CARE MNGMENT TRAINING: CPT

## 2024-09-17 ENCOUNTER — HOSPITAL ENCOUNTER (OUTPATIENT)
Facility: HOSPITAL | Age: 64
Setting detail: RECURRING SERIES
Discharge: HOME | End: 2024-09-20
Payer: COMMERCIAL

## 2024-09-17 PROCEDURE — 97016 VASOPNEUMATIC DEVICE THERAPY: CPT

## 2024-09-17 PROCEDURE — 97110 THERAPEUTIC EXERCISES: CPT

## 2024-09-17 PROCEDURE — 97535 SELF CARE MNGMENT TRAINING: CPT

## 2024-09-19 ENCOUNTER — HOSPITAL ENCOUNTER (OUTPATIENT)
Facility: HOSPITAL | Age: 64
Setting detail: RECURRING SERIES
Discharge: HOME | End: 2024-09-22
Payer: COMMERCIAL

## 2024-09-19 PROCEDURE — 97016 VASOPNEUMATIC DEVICE THERAPY: CPT

## 2024-09-19 PROCEDURE — 97535 SELF CARE MNGMENT TRAINING: CPT

## 2024-09-19 PROCEDURE — 97110 THERAPEUTIC EXERCISES: CPT

## 2024-09-23 ENCOUNTER — HOSPITAL ENCOUNTER (OUTPATIENT)
Facility: HOSPITAL | Age: 64
Setting detail: RECURRING SERIES
Discharge: HOME | End: 2024-09-26
Payer: COMMERCIAL

## 2024-09-23 ENCOUNTER — TELEPHONE (OUTPATIENT)
Facility: HOSPITAL | Age: 64
End: 2024-09-23

## 2024-09-23 PROCEDURE — 97530 THERAPEUTIC ACTIVITIES: CPT

## 2024-09-23 PROCEDURE — 97110 THERAPEUTIC EXERCISES: CPT

## 2024-09-23 PROCEDURE — 97112 NEUROMUSCULAR REEDUCATION: CPT

## 2024-09-23 PROCEDURE — 97016 VASOPNEUMATIC DEVICE THERAPY: CPT

## 2024-09-23 PROCEDURE — 97535 SELF CARE MNGMENT TRAINING: CPT

## 2024-09-25 ENCOUNTER — HOSPITAL ENCOUNTER (OUTPATIENT)
Facility: HOSPITAL | Age: 64
Setting detail: RECURRING SERIES
Discharge: HOME | End: 2024-09-28
Payer: COMMERCIAL

## 2024-09-25 PROCEDURE — 97535 SELF CARE MNGMENT TRAINING: CPT

## 2024-09-25 PROCEDURE — 97016 VASOPNEUMATIC DEVICE THERAPY: CPT

## 2024-09-25 PROCEDURE — 97110 THERAPEUTIC EXERCISES: CPT

## 2024-09-30 ENCOUNTER — HOSPITAL ENCOUNTER (OUTPATIENT)
Facility: HOSPITAL | Age: 64
Setting detail: RECURRING SERIES
Discharge: HOME | End: 2024-10-03
Payer: COMMERCIAL

## 2024-09-30 PROCEDURE — 97112 NEUROMUSCULAR REEDUCATION: CPT

## 2024-09-30 PROCEDURE — 97530 THERAPEUTIC ACTIVITIES: CPT

## 2024-09-30 PROCEDURE — 97535 SELF CARE MNGMENT TRAINING: CPT

## 2024-09-30 PROCEDURE — 97016 VASOPNEUMATIC DEVICE THERAPY: CPT

## 2024-09-30 PROCEDURE — 97110 THERAPEUTIC EXERCISES: CPT

## 2024-09-30 NOTE — PROGRESS NOTES
PHYSICAL / OCCUPATIONAL THERAPY - DAILY TREATMENT NOTE     Patient Name: Kayli Bobby    Date: 2024    : 1960  Insurance: Payor: CALVIN / Plan: CALVIN DIRECT RICHARD / Product Type: *No Product type* /      Patient  verified Yes     Visit #   Current / Total 9 25   Time   In / Out 2:30 3:48   Pain   In / Out 3/10 0/10   Subjective Functional Status/Changes: \"I have some pain today but, not bad.\"   Changes to:  Allergies, Med Hx, Sx Hx?   no       TREATMENT AREA =  Pain in left shoulder [M25.512]  Pain in right shoulder [M25.511]    If an interpreting service is utilized for treatment of this patient, the contents of this document represent the material reviewed with the patient via the .     OBJECTIVE    Modalities Rationale:     decrease edema, decrease inflammation, and decrease pain to improve patient's ability to progress to PLOF and address remaining functional goals.     min [] Estim Unattended, type/location:                                      []  w/ice    []  w/heat    min [] Estim Attended, type/location:                                     []  w/US     []  w/ice    []  w/heat    []  TENS insruct      min []  Mechanical Traction: type/lbs                   []  pro   []  sup   []  int   []  cont    []  before manual    []  after manual    min []  Ultrasound, settings/location:      min []  Iontophoresis w/ dexamethasone, location:                                               []  take home patch       []  in clinic        min  unbilled []  Ice     []  Heat    location/position:     min []  Paraffin,  details:    10 min [x]  Vasopneumatic Device, press/temp: Med/Low    min []  Whirlpool / Fluido:    If using vaso (only need to measure limb vaso being performed on)      pre-treatment girth : 41 cm      post-treatment girth : 40 cm       measured at (landmark location) :  Acromion    min []  Other:    Skin assessment post-treatment (if applicable):    [x]  intact    []  redness-

## 2024-09-30 NOTE — PROGRESS NOTES
[facility]  [address] [place] [address]  Ph [phone]  Fx [phone]    Physical Therapy Progress Note  Patient name: Kayli Bobby Start of Care: 9/3/2024   Referral source: Enrrique Morataya III, MD : 1960   Medical/Treatment Diagnosis: Pain in right shoulder [M25.511] Onset Date:8/15/2024   Prior Hospitalization: see medical history Provider#: [id number]   Medications: Verified on Patient Summary List    Comorbidities:  cervical fusion, lumbar fusion, carpal tunnel surgery right , partial hysterectomy, HTN, hypothyroid   Prior Level of Function:functionally independent, no AD, active lifestyle     Reporting Period: 24-24    Visits from Start of Care: 9    Missed Visits: 0    Updated Goals/Measure of Progress:   Short Term Goals: To be accomplished in 12 treatments:  Patient will be independent and compliant with HEP to progress toward goals and restore functional mobility.   Eval Status: issued at eval  PN 2024: Pt reports at least 1x/day compliance  Progressing     Patient will improve quick Dash score by 19 percentage points to improve functional tolerance for exercise.                Scoring:           MCID:  19%                               41-60% = moderate difficulty                                      0-40% = mild to no difficulty       > 61% = severe difficulty  Eval Status: 68.18%               PN 2024: QuickDASH: 40.90%   Progressing     Patient will improve pain in left  shoulder at most to 5/10   to improve her tolerance and restore prior level of function.  Eval Status: 10/10 at worst  PN 2024: 5/10 at worst in the past week   Progressing     Pt will have 2/5 left shoulder strength to return to goals of performing ADLs with increased ease.  Eval Status: NT - did not have protocol  PN 2024: Left Shoulder Flexion: 3-/5, Abduction: 3-/5, ER:3/5, IR: 3/5     Progressing     Pt will have painfree left shoulder  PROM WFL to

## 2024-10-03 ENCOUNTER — HOSPITAL ENCOUNTER (OUTPATIENT)
Facility: HOSPITAL | Age: 64
Setting detail: RECURRING SERIES
Discharge: HOME | End: 2024-10-06
Payer: COMMERCIAL

## 2024-10-03 PROCEDURE — 97535 SELF CARE MNGMENT TRAINING: CPT

## 2024-10-03 PROCEDURE — 97016 VASOPNEUMATIC DEVICE THERAPY: CPT

## 2024-10-03 PROCEDURE — 97110 THERAPEUTIC EXERCISES: CPT

## 2024-10-03 NOTE — PROGRESS NOTES
PHYSICAL / OCCUPATIONAL THERAPY - DAILY TREATMENT NOTE     Patient Name: Kayli Bobby    Date: 10/3/2024    : 1960  Insurance: Payor: CALVIN / Plan: CALVIN DIRECT RICHARD / Product Type: *No Product type* /      Patient  verified Yes     Visit #   Current / Total 10 25   Time   In / Out 9:08 10:04   Pain   In / Out 4/10 2/10   Subjective Functional Status/Changes: \"I have some pain today. I have trouble getting comfortable at night.\"   Changes to:  Allergies, Med Hx, Sx Hx?   no       TREATMENT AREA =  Pain in left shoulder [M25.512]  Pain in right shoulder [M25.511]    If an interpreting service is utilized for treatment of this patient, the contents of this document represent the material reviewed with the patient via the .     OBJECTIVE    Modalities Rationale:     decrease edema, decrease inflammation, and decrease pain to improve patient's ability to progress to PLOF and address remaining functional goals.     min [] Estim Unattended, type/location:                                      []  w/ice    []  w/heat    min [] Estim Attended, type/location:                                     []  w/US     []  w/ice    []  w/heat    []  TENS insruct      min []  Mechanical Traction: type/lbs                   []  pro   []  sup   []  int   []  cont    []  before manual    []  after manual    min []  Ultrasound, settings/location:      min []  Iontophoresis w/ dexamethasone, location:                                               []  take home patch       []  in clinic        min  unbilled []  Ice     []  Heat    location/position:     min []  Paraffin,  details:    10 min [x]  Vasopneumatic Device, press/temp: Med/Low    min []  Whirlpool / Fluido:    If using vaso (only need to measure limb vaso being performed on)      pre-treatment girth : 42 cm      post-treatment girth : 41.5 cm      measured at (landmark location) :  Acromion    min []  Other:    Skin assessment post-treatment (if

## 2024-10-08 ENCOUNTER — HOSPITAL ENCOUNTER (OUTPATIENT)
Facility: HOSPITAL | Age: 64
Setting detail: RECURRING SERIES
Discharge: HOME | End: 2024-10-11
Payer: COMMERCIAL

## 2024-10-08 PROCEDURE — 97112 NEUROMUSCULAR REEDUCATION: CPT

## 2024-10-08 PROCEDURE — 97530 THERAPEUTIC ACTIVITIES: CPT

## 2024-10-08 PROCEDURE — 97110 THERAPEUTIC EXERCISES: CPT

## 2024-10-08 NOTE — PROGRESS NOTES
PHYSICAL / OCCUPATIONAL THERAPY - DAILY TREATMENT NOTE     Patient Name: Kayli Bobby    Date: 10/8/2024    : 1960  Insurance: Payor: CALVIN / Plan: CALVIN DIRECT RICHARD / Product Type: *No Product type* /      Patient  verified Yes     Visit #   Current / Total 11 25   Time   In / Out 9:10 10:02   Pain   In / Out 2 1   Subjective Functional Status/Changes: Patient states she is pleased with how her left UE ROM is improving.   Changes to:  Allergies, Med Hx, Sx Hx?   no       TREATMENT AREA =  Pain in left shoulder [M25.512]  Pain in right shoulder [M25.511]    If an interpreting service is utilized for treatment of this patient, the contents of this document represent the material reviewed with the patient via the .     OBJECTIVE    Modalities Rationale:     to decrease pain, decrease edema, decrease inflammation     min [] Estim Unattended, type/location:                                      []  w/ice    []  w/heat    min [] Estim Attended, type/location:                                     []  w/US     []  w/ice    []  w/heat    []  TENS insruct      min []  Mechanical Traction: type/lbs                   []  pro   []  sup   []  int   []  cont    []  before manual    []  after manual    min []  Ultrasound, settings/location:      min []  Iontophoresis w/ dexamethasone, location:                                               []  take home patch       []  in clinic        min  unbilled []  Ice     []  Heat    location/position:     min []  Paraffin,  details:    10 min [x]  Vasopneumatic Device, press/temp: Moderate/34 degs    min []  Whirlpool / Fluido:    If using vaso (only need to measure limb vaso being performed on)      pre-treatment girth : 43      post-treatment girth : 42.8      measured at (landmark location) :  acromion    min []  Other:    Skin assessment post-treatment (if applicable):    []  intact    []  redness- no adverse reaction                 []redness - adverse

## 2024-10-10 ENCOUNTER — HOSPITAL ENCOUNTER (OUTPATIENT)
Facility: HOSPITAL | Age: 64
Setting detail: RECURRING SERIES
Discharge: HOME | End: 2024-10-13
Payer: COMMERCIAL

## 2024-10-10 PROCEDURE — 97530 THERAPEUTIC ACTIVITIES: CPT

## 2024-10-10 PROCEDURE — 97110 THERAPEUTIC EXERCISES: CPT

## 2024-10-10 PROCEDURE — 97016 VASOPNEUMATIC DEVICE THERAPY: CPT

## 2024-10-10 PROCEDURE — 97112 NEUROMUSCULAR REEDUCATION: CPT

## 2024-10-10 NOTE — PROGRESS NOTES
9/30/2024: see above   Progressing     Long Term Goals: To be accomplished in 24 treatments:  Patient will improve QuickDASH score by 30 percentage points to improve functional tolerance for ADLs.  Eval Status: 68.18%               PN 9/30/2024: QuickDASH: 40.90%   Progressing     Pt will have painfree  AROM WFL to aid in functional mechanics for ADLs.  Eval Status: NT  PN 9/30/2024: Flexion: 105 deg, Abduction:78 deg  ER: T2, IR: L2, Progressing  Current: AROM flexion to about 150 degrees and AROM scaption to about 160 degrees today during exercises   10/8/24, progressing     Pt will have 4/5 left shoulder strength to return to goals of independence with self care.  Eval Status: NT               PN 9/30/2024: Left Shoulder Flexion: 3-/5, Abduction: 3-/5, ER:3/5, IR: 3/5  Progressing     Patient will improve pain in left shoulder to 2/10 at worst to improve ADL tolerance and restore prior level of function.  Eval Status: 10/10 at worst               PN 9/30/2024: 5/10 at worst in the past week   Progressing     5.  Patient will be able to raise a 3# weight up to/down from an upper cabinet shelf to simulate ADLs in the kitchen  Eval Status:  NT               PN 9/30/2024: Able to lift 1# weight up to upper cabinet with significant difficulty   Progressing     6.  Pat will be able to carry at least 20# total (distributed between both arms) during a Farmer's carry with good posture in order to simulate carrying groceries.  Eval status: NT              PN 9/30/2024: Unable due to post surgical protocol   Progressing       PLAN  Yes  Continue plan of care  []  Upgrade activities as tolerated  []  Discharge due to :  []  Other:    Belen Nash PT    10/10/2024    10:40 AM    Future Appointments   Date Time Provider Department Center   10/10/2024 11:10 AM Belen Nash PT [facility]   10/15/2024  9:[facility]   10/17/2024  9:10 AM Kelby Chun PTA [facility]   10/22/2024  9:10 AM [facility]

## 2024-10-15 ENCOUNTER — HOSPITAL ENCOUNTER (OUTPATIENT)
Facility: HOSPITAL | Age: 64
Setting detail: RECURRING SERIES
Discharge: HOME | End: 2024-10-18
Payer: COMMERCIAL

## 2024-10-15 PROCEDURE — 97110 THERAPEUTIC EXERCISES: CPT

## 2024-10-15 PROCEDURE — 97016 VASOPNEUMATIC DEVICE THERAPY: CPT

## 2024-10-15 PROCEDURE — 97535 SELF CARE MNGMENT TRAINING: CPT

## 2024-10-15 PROCEDURE — 97112 NEUROMUSCULAR REEDUCATION: CPT

## 2024-10-15 PROCEDURE — 97530 THERAPEUTIC ACTIVITIES: CPT

## 2024-10-15 NOTE — PROGRESS NOTES
Progressed/Changed HEP, detail:    [] Other detail:       Objective Information/Functional Measures/Assessment    Pt arrived in clinic reporting minor pain in Left Shoulder at this time. Pt continues to progress with general ROM and strengthening therex. Pt demonstrates improved AROM performance with decreased shoulder hike compensation and more active parascapular activation performance. Pt was able to perform bent over parascapular strengthening therex with reports of increased fatigue and soreness but, not above tolerance. Pt was progress to side lying Shoulder ER and Abduction to promote improved parascapular activation and stabilization. Pt received VASO post tx for reduction of pain and swelling. Pt will benefit from continued therapy to address unmet goals and to return to prior level of activity.       Patient will continue to benefit from skilled PT / OT services to modify and progress therapeutic interventions, analyze and address functional mobility deficits, analyze and address ROM deficits, analyze and address strength deficits, analyze and address soft tissue restrictions, and analyze and cue for proper movement patterns to address functional deficits and attain remaining goals.    Progress toward goals / Updated goals:  []  See Progress Note/Recertification    Short Term Goals: To be accomplished in 12 treatments:  Patient will be independent and compliant with HEP to progress toward goals and restore functional mobility.   Eval Status: issued at eval  Current: Patient reports compliance with their HEP.   10/8/2024, MET      Patient will improve quick Dash score by 19 percentage points to improve functional tolerance for exercise.                Scoring:           MCID:  19%                               41-60% = moderate difficulty                                      0-40% = mild to no difficulty       > 61% = severe difficulty  Eval Status: 68.18%              PN 9/30/2024: QuickDASH: 40.90%

## 2024-10-17 ENCOUNTER — HOSPITAL ENCOUNTER (OUTPATIENT)
Facility: HOSPITAL | Age: 64
Setting detail: RECURRING SERIES
Discharge: HOME | End: 2024-10-20
Payer: COMMERCIAL

## 2024-10-17 PROCEDURE — 97112 NEUROMUSCULAR REEDUCATION: CPT

## 2024-10-17 PROCEDURE — 97535 SELF CARE MNGMENT TRAINING: CPT

## 2024-10-17 PROCEDURE — 97530 THERAPEUTIC ACTIVITIES: CPT

## 2024-10-17 PROCEDURE — 97110 THERAPEUTIC EXERCISES: CPT

## 2024-10-17 PROCEDURE — 97016 VASOPNEUMATIC DEVICE THERAPY: CPT

## 2024-10-17 NOTE — PROGRESS NOTES
PHYSICAL / OCCUPATIONAL THERAPY - DAILY TREATMENT NOTE     Patient Name: Kayli Bobby    Date: 10/17/2024    : 1960  Insurance: Payor: CALVIN / Plan: CALVIN DIRECT RICHARD / Product Type: *No Product type* /      Patient  verified Yes     Visit #   Current / Total 14 25   Time   In / Out 9:10 10:17   Pain   In / Out 2/10 0/10   Subjective Functional Status/Changes: \"I have some pain today but, not too bad.\"   Changes to:  Allergies, Med Hx, Sx Hx?   no       TREATMENT AREA =  Pain in left shoulder [M25.512]  Pain in right shoulder [M25.511]    If an interpreting service is utilized for treatment of this patient, the contents of this document represent the material reviewed with the patient via the .     OBJECTIVE    Modalities Rationale:     decrease edema, decrease inflammation, and decrease pain to improve patient's ability to progress to PLOF and address remaining functional goals.     min [] Estim Unattended, type/location:                                      []  w/ice    []  w/heat    min [] Estim Attended, type/location:                                     []  w/US     []  w/ice    []  w/heat    []  TENS insruct      min []  Mechanical Traction: type/lbs                   []  pro   []  sup   []  int   []  cont    []  before manual    []  after manual    min []  Ultrasound, settings/location:      min []  Iontophoresis w/ dexamethasone, location:                                               []  take home patch       []  in clinic        min  unbilled []  Ice     []  Heat    location/position:     min []  Paraffin,  details:    10 min [x]  Vasopneumatic Device, press/temp: Med/Low    min []  Whirlpool / Fluido:    If using vaso (only need to measure limb vaso being performed on)      pre-treatment girth : 41.2 cm      post-treatment girth : 40.7 cm      measured at (landmark location) :  Acromion    min []  Other:    Skin assessment post-treatment (if applicable):    [x]  intact    []

## 2024-10-22 ENCOUNTER — HOSPITAL ENCOUNTER (OUTPATIENT)
Facility: HOSPITAL | Age: 64
Setting detail: RECURRING SERIES
Discharge: HOME | End: 2024-10-25
Payer: COMMERCIAL

## 2024-10-22 PROCEDURE — 97535 SELF CARE MNGMENT TRAINING: CPT

## 2024-10-22 PROCEDURE — 97016 VASOPNEUMATIC DEVICE THERAPY: CPT

## 2024-10-22 PROCEDURE — 97530 THERAPEUTIC ACTIVITIES: CPT

## 2024-10-22 PROCEDURE — 97112 NEUROMUSCULAR REEDUCATION: CPT

## 2024-10-22 PROCEDURE — 97110 THERAPEUTIC EXERCISES: CPT

## 2024-10-22 NOTE — PROGRESS NOTES
PHYSICAL / OCCUPATIONAL THERAPY - DAILY TREATMENT NOTE     Patient Name: Kayli Bobby    Date: 10/22/2024    : 1960  Insurance: Payor: [employer]VIN / Plan: [employer]VIN DIRECT RICHARD / Product Type: *No Product type* /      Patient  verified Yes     Visit #   Current / Total 15 25   Time   In / Out 9:09 10:22   Pain   In / Out 2/10  0/10   Subjective Functional Status/Changes: \"I have a little pain today but, not too bad.\"   Changes to:  Allergies, Med Hx, Sx Hx?   no       TREATMENT AREA =  Pain in left shoulder [M25.512]  Pain in right shoulder [M25.511]    If an interpreting service is utilized for treatment of this patient, the contents of this document represent the material reviewed with the patient via the .     OBJECTIVE    Modalities Rationale:     decrease edema, decrease inflammation, and decrease pain to improve patient's ability to progress to PLOF and address remaining functional goals.     min [] Estim Unattended, type/location:                                      []  w/ice    []  w/heat    min [] Estim Attended, type/location:                                     []  w/US     []  w/ice    []  w/heat    []  TENS insruct      min []  Mechanical Traction: type/lbs                   []  pro   []  sup   []  int   []  cont    []  before manual    []  after manual    min []  Ultrasound, settings/location:      min []  Iontophoresis w/ dexamethasone, location:                                               []  take home patch       []  in clinic        min  unbilled []  Ice     []  Heat    location/position:     min []  Paraffin,  details:    10 min [x]  Vasopneumatic Device, press/temp: Med/Low    min []  Whirlpool / Fluido:    If using vaso (only need to measure limb vaso being performed on)      pre-treatment girth : 43.5 cm      post-treatment girth : 42.7 cm      measured at (landmark location) :  [place]    min []  Other:    Skin assessment post-treatment (if applicable):    [x]  intact

## 2024-10-24 ENCOUNTER — HOSPITAL ENCOUNTER (OUTPATIENT)
Facility: HOSPITAL | Age: 64
Setting detail: RECURRING SERIES
Discharge: HOME | End: 2024-10-27
Payer: COMMERCIAL

## 2024-10-24 PROCEDURE — 97016 VASOPNEUMATIC DEVICE THERAPY: CPT

## 2024-10-24 PROCEDURE — 97110 THERAPEUTIC EXERCISES: CPT

## 2024-10-24 PROCEDURE — 97535 SELF CARE MNGMENT TRAINING: CPT

## 2024-10-24 PROCEDURE — 97530 THERAPEUTIC ACTIVITIES: CPT

## 2024-10-24 NOTE — PROGRESS NOTES
Status: NT               PN 9/30/2024: Left Shoulder Flexion: 3-/5, Abduction: 3-/5, ER:3/5, IR: 3/5  Progressing    Current: pt continues to require VC's for parascapular activation and utilization for AROM 10/24/24    Patient will improve pain in left shoulder to 2/10 at worst to improve ADL tolerance and restore prior level of function.  Eval Status: 10/10 at worst               PN 9/30/2024: 5/10 at worst in the past week   Progressing                 5.  Patient will be able to raise a 3# weight up to/down from an upper cabinet shelf to simulate ADLs in the kitchen  Eval Status:  NT               PN 9/30/2024: Able to lift 1# weight up to upper cabinet with significant difficulty   Progressing               Current: Pt continues to demonstrate mild shoulder hike compensation with AROM flexion and abduction 10/15/24     6.  Pat will be able to carry at least 20# total (distributed between both arms) during a Farmer's carry with good posture in order to simulate carrying groceries.  Eval status: NT              PN 9/30/2024: Unable due to post surgical protocol   Progressing    PLAN  Yes  Continue plan of care  []  Upgrade activities as tolerated  []  Discharge due to :  []  Other:    Kelby Chun PTA    10/24/2024    7:53 AM    Future Appointments   Date Time Provider Department Center   10/24/2024  9:10 AM Kelby Chun PTA [facility]   10/29/2024  1:10 PM Kelby Chun PTA [facility]   10/31/2024  9:10 AM Cady Bravo, PT [facility]

## 2024-10-29 ENCOUNTER — HOSPITAL ENCOUNTER (OUTPATIENT)
Facility: HOSPITAL | Age: 64
Setting detail: RECURRING SERIES
Discharge: HOME | End: 2024-11-01
Payer: COMMERCIAL

## 2024-10-29 PROCEDURE — 97535 SELF CARE MNGMENT TRAINING: CPT

## 2024-10-29 PROCEDURE — 97530 THERAPEUTIC ACTIVITIES: CPT

## 2024-10-29 PROCEDURE — 97110 THERAPEUTIC EXERCISES: CPT

## 2024-10-29 PROCEDURE — 97112 NEUROMUSCULAR REEDUCATION: CPT

## 2024-10-29 PROCEDURE — 97016 VASOPNEUMATIC DEVICE THERAPY: CPT

## 2024-10-29 NOTE — PROGRESS NOTES
PHYSICAL / OCCUPATIONAL THERAPY - DAILY TREATMENT NOTE     Patient Name: Kayli Bobby    Date: 10/29/2024    : 1960  Insurance: Payor: CALVIN / Plan: [employer]VIN DIRECT RICHARD / Product Type: *No Product type* /      Patient  verified Yes     Visit #   Current / Total 17 25   Time   In / Out 1:10 2:29   Pain   In / Out 0/10 0/10   Subjective Functional Status/Changes: \"I don't have any pain right now.\"   Changes to:  Allergies, Med Hx, Sx Hx?   no       TREATMENT AREA =  Pain in left shoulder [M25.512]  Pain in right shoulder [M25.511]    If an interpreting service is utilized for treatment of this patient, the contents of this document represent the material reviewed with the patient via the .     OBJECTIVE    Modalities Rationale:     decrease edema, decrease inflammation, and decrease pain to improve patient's ability to progress to PLOF and address remaining functional goals.     min [] Estim Unattended, type/location:                                      []  w/ice    []  w/heat    min [] Estim Attended, type/location:                                     []  w/US     []  w/ice    []  w/heat    []  TENS insruct      min []  Mechanical Traction: type/lbs                   []  pro   []  sup   []  int   []  cont    []  before manual    []  after manual    min []  Ultrasound, settings/location:      min []  Iontophoresis w/ dexamethasone, location:                                               []  take home patch       []  in clinic        min  unbilled []  Ice     []  Heat    location/position:     min []  Paraffin,  details:    10 min [x]  Vasopneumatic Device, press/temp: Med/Low    min []  Whirlpool / Fluido:    If using vaso (only need to measure limb vaso being performed on)      pre-treatment girth : 41.5 cm      post-treatment girth : 41 cm      measured at (landmark location) :  Mid-Patella    min []  Other:    Skin assessment post-treatment (if applicable):    [x]  intact    []

## 2024-10-31 ENCOUNTER — APPOINTMENT (OUTPATIENT)
Facility: HOSPITAL | Age: 64
End: 2024-10-31
Payer: COMMERCIAL

## 2024-11-01 NOTE — PROGRESS NOTES
[facility]  [address] [place] [address]  Ph [phone]  Fx [phone]    Physical Therapy Discharge Summary    Patient name: Kayli Bobby Start of Care: 9/3/2024   Referral source: Enrrique Morataya III, MD : 1960   Medical/Treatment Diagnosis: Pain in right shoulder [M25.511] Onset Date:8/15/2024   Prior Hospitalization: see medical history Provider#: [id number]   Medications: Verified on Patient Summary List     Comorbidities:  cervical fusion, lumbar fusion, carpal tunnel surgery right , partial hysterectomy, HTN, hypothyroid   Prior Level of Function:functionally independent, no AD, active lifestyle     Visits from Start of Care: ***    Missed Visits: ***    Reporting Period : *** to ***    Goals/Measure of Progress:  ***    Assessment/ Summary of Care: ***    RECOMMENDATIONS:  []Discontinue therapy: []Patient has reached or is progressing toward set goals      []Patient is non-compliant or has abdicated      []Due to lack of appreciable progress towards set goals    Kasey Benitez, PT 2024 9:51 AM

## 2025-08-01 ENCOUNTER — HOSPITAL ENCOUNTER (OUTPATIENT)
Facility: HOSPITAL | Age: 65
Setting detail: SPECIMEN
Discharge: HOME | End: 2025-08-04

## 2025-08-01 ENCOUNTER — TRANSCRIBE ORDERS (OUTPATIENT)
Facility: HOSPITAL | Age: 65
End: 2025-08-01

## 2025-08-01 ENCOUNTER — HOSPITAL ENCOUNTER (OUTPATIENT)
Facility: HOSPITAL | Age: 65
Discharge: HOME | End: 2025-08-03
Payer: COMMERCIAL

## 2025-08-01 DIAGNOSIS — S46.111A RUPTURE LONG HEAD BICEPS TENDON, RIGHT, INITIAL ENCOUNTER: ICD-10-CM

## 2025-08-01 DIAGNOSIS — M19.011 ARTHRITIS OF RIGHT SHOULDER REGION: ICD-10-CM

## 2025-08-01 DIAGNOSIS — S46.011S TRAUMATIC COMPLETE TEAR OF RIGHT ROTATOR CUFF, SEQUELA: ICD-10-CM

## 2025-08-01 DIAGNOSIS — M75.21 BICIPITAL TENDINITIS OF RIGHT SHOULDER: ICD-10-CM

## 2025-08-01 DIAGNOSIS — S46.011S TRAUMATIC COMPLETE TEAR OF RIGHT ROTATOR CUFF, SEQUELA: Primary | ICD-10-CM

## 2025-08-01 LAB — SENTARA SPECIMEN COLLECTION: NORMAL

## 2025-08-01 PROCEDURE — 93005 ELECTROCARDIOGRAM TRACING: CPT

## 2025-08-01 PROCEDURE — 99001 SPECIMEN HANDLING PT-LAB: CPT

## 2025-08-05 LAB
EKG ATRIAL RATE: 72 BPM
EKG DIAGNOSIS: NORMAL
EKG P AXIS: 78 DEGREES
EKG P-R INTERVAL: 176 MS
EKG Q-T INTERVAL: 368 MS
EKG QRS DURATION: 80 MS
EKG QTC CALCULATION (BAZETT): 402 MS
EKG R AXIS: 45 DEGREES
EKG T AXIS: 72 DEGREES
EKG VENTRICULAR RATE: 72 BPM

## 2025-08-25 PROBLEM — M75.121 COMPLETE TEAR OF RIGHT ROTATOR CUFF: Status: ACTIVE | Noted: 2025-08-25

## 2025-09-03 ENCOUNTER — ANESTHESIA EVENT (OUTPATIENT)
Facility: HOSPITAL | Age: 65
End: 2025-09-03
Payer: COMMERCIAL

## 2025-09-03 ENCOUNTER — ANESTHESIA (OUTPATIENT)
Facility: HOSPITAL | Age: 65
End: 2025-09-03
Payer: COMMERCIAL

## 2025-09-03 ENCOUNTER — HOSPITAL ENCOUNTER (OUTPATIENT)
Facility: HOSPITAL | Age: 65
Setting detail: OUTPATIENT SURGERY
Discharge: HOME | End: 2025-09-03
Attending: ORTHOPAEDIC SURGERY | Admitting: ORTHOPAEDIC SURGERY
Payer: COMMERCIAL

## 2025-09-03 VITALS
TEMPERATURE: 97.9 F | SYSTOLIC BLOOD PRESSURE: 122 MMHG | RESPIRATION RATE: 15 BRPM | OXYGEN SATURATION: 98 % | HEIGHT: 66 IN | WEIGHT: 150.4 LBS | BODY MASS INDEX: 24.17 KG/M2 | DIASTOLIC BLOOD PRESSURE: 67 MMHG | HEART RATE: 74 BPM

## 2025-09-03 PROBLEM — M75.121 COMPLETE TEAR OF RIGHT ROTATOR CUFF: Status: RESOLVED | Noted: 2025-08-25 | Resolved: 2025-09-03

## 2025-09-03 PROCEDURE — 3600000012 HC SURGERY LEVEL 2 ADDTL 15MIN: Performed by: ORTHOPAEDIC SURGERY

## 2025-09-03 PROCEDURE — 7100000011 HC PHASE II RECOVERY - ADDTL 15 MIN: Performed by: ORTHOPAEDIC SURGERY

## 2025-09-03 PROCEDURE — 6360000002 HC RX W HCPCS: Performed by: PHYSICIAN ASSISTANT

## 2025-09-03 PROCEDURE — 2580000003 HC RX 258: Performed by: ORTHOPAEDIC SURGERY

## 2025-09-03 PROCEDURE — 2500000003 HC RX 250 WO HCPCS: Performed by: ORTHOPAEDIC SURGERY

## 2025-09-03 PROCEDURE — 64415 NJX AA&/STRD BRCH PLXS IMG: CPT | Performed by: ANESTHESIOLOGY

## 2025-09-03 PROCEDURE — 2500000003 HC RX 250 WO HCPCS: Performed by: ANESTHESIOLOGY

## 2025-09-03 PROCEDURE — 3700000000 HC ANESTHESIA ATTENDED CARE: Performed by: ORTHOPAEDIC SURGERY

## 2025-09-03 PROCEDURE — 2720000010 HC SURG SUPPLY STERILE: Performed by: ORTHOPAEDIC SURGERY

## 2025-09-03 PROCEDURE — C1713 ANCHOR/SCREW BN/BN,TIS/BN: HCPCS | Performed by: ORTHOPAEDIC SURGERY

## 2025-09-03 PROCEDURE — 3700000001 HC ADD 15 MINUTES (ANESTHESIA): Performed by: ORTHOPAEDIC SURGERY

## 2025-09-03 PROCEDURE — 7100000010 HC PHASE II RECOVERY - FIRST 15 MIN: Performed by: ORTHOPAEDIC SURGERY

## 2025-09-03 PROCEDURE — 2709999900 HC NON-CHARGEABLE SUPPLY: Performed by: ORTHOPAEDIC SURGERY

## 2025-09-03 PROCEDURE — 6360000002 HC RX W HCPCS: Performed by: NURSE ANESTHETIST, CERTIFIED REGISTERED

## 2025-09-03 PROCEDURE — 3600000002 HC SURGERY LEVEL 2 BASE: Performed by: ORTHOPAEDIC SURGERY

## 2025-09-03 PROCEDURE — 6360000002 HC RX W HCPCS: Performed by: ANESTHESIOLOGY

## 2025-09-03 DEVICE — IMPLANTABLE DEVICE: Type: IMPLANTABLE DEVICE | Site: SHOULDER | Status: FUNCTIONAL

## 2025-09-03 RX ORDER — PROPOFOL 10 MG/ML
INJECTION, EMULSION INTRAVENOUS
Status: DISCONTINUED | OUTPATIENT
Start: 2025-09-03 | End: 2025-09-03 | Stop reason: SDUPTHER

## 2025-09-03 RX ORDER — HYDROMORPHONE HYDROCHLORIDE 1 MG/ML
0.5 INJECTION, SOLUTION INTRAMUSCULAR; INTRAVENOUS; SUBCUTANEOUS EVERY 5 MIN PRN
Refills: 0 | Status: CANCELLED | OUTPATIENT
Start: 2025-09-03

## 2025-09-03 RX ORDER — CEPHALEXIN 500 MG/1
500 CAPSULE ORAL 4 TIMES DAILY
COMMUNITY

## 2025-09-03 RX ORDER — SODIUM CHLORIDE, SODIUM LACTATE, POTASSIUM CHLORIDE, AND CALCIUM CHLORIDE .6; .31; .03; .02 G/100ML; G/100ML; G/100ML; G/100ML
IRRIGANT IRRIGATION PRN
Status: DISCONTINUED | OUTPATIENT
Start: 2025-09-03 | End: 2025-09-03 | Stop reason: ALTCHOICE

## 2025-09-03 RX ORDER — FENTANYL CITRATE 50 UG/ML
25 INJECTION, SOLUTION INTRAMUSCULAR; INTRAVENOUS EVERY 5 MIN PRN
Refills: 0 | Status: CANCELLED | OUTPATIENT
Start: 2025-09-03

## 2025-09-03 RX ORDER — DEXAMETHASONE SODIUM PHOSPHATE 10 MG/ML
INJECTION, SOLUTION INTRAMUSCULAR; INTRAVENOUS
Status: COMPLETED | OUTPATIENT
Start: 2025-09-03 | End: 2025-09-03

## 2025-09-03 RX ORDER — DEXMEDETOMIDINE HYDROCHLORIDE 100 UG/ML
INJECTION, SOLUTION INTRAVENOUS
Status: COMPLETED | OUTPATIENT
Start: 2025-09-03 | End: 2025-09-03

## 2025-09-03 RX ORDER — MIDAZOLAM HYDROCHLORIDE 1 MG/ML
INJECTION, SOLUTION INTRAMUSCULAR; INTRAVENOUS
Status: COMPLETED
Start: 2025-09-03 | End: 2025-09-03

## 2025-09-03 RX ORDER — PROCHLORPERAZINE EDISYLATE 5 MG/ML
5 INJECTION INTRAMUSCULAR; INTRAVENOUS
Status: CANCELLED | OUTPATIENT
Start: 2025-09-03

## 2025-09-03 RX ORDER — MIDAZOLAM HYDROCHLORIDE 1 MG/ML
INJECTION, SOLUTION INTRAMUSCULAR; INTRAVENOUS
Status: COMPLETED | OUTPATIENT
Start: 2025-09-03 | End: 2025-09-03

## 2025-09-03 RX ORDER — FENTANYL CITRATE 50 UG/ML
INJECTION, SOLUTION INTRAMUSCULAR; INTRAVENOUS
Status: COMPLETED
Start: 2025-09-03 | End: 2025-09-03

## 2025-09-03 RX ORDER — SODIUM CHLORIDE 0.9 % (FLUSH) 0.9 %
5-40 SYRINGE (ML) INJECTION PRN
Status: DISCONTINUED | OUTPATIENT
Start: 2025-09-03 | End: 2025-09-03 | Stop reason: HOSPADM

## 2025-09-03 RX ORDER — SODIUM CHLORIDE 9 MG/ML
INJECTION, SOLUTION INTRAVENOUS PRN
Status: CANCELLED | OUTPATIENT
Start: 2025-09-03

## 2025-09-03 RX ORDER — LIDOCAINE HYDROCHLORIDE 20 MG/ML
INJECTION, SOLUTION EPIDURAL; INFILTRATION; INTRACAUDAL; PERINEURAL
Status: DISCONTINUED | OUTPATIENT
Start: 2025-09-03 | End: 2025-09-03 | Stop reason: SDUPTHER

## 2025-09-03 RX ORDER — ROPIVACAINE HYDROCHLORIDE 2 MG/ML
INJECTION, SOLUTION EPIDURAL; INFILTRATION; PERINEURAL
Status: COMPLETED | OUTPATIENT
Start: 2025-09-03 | End: 2025-09-03

## 2025-09-03 RX ORDER — FENTANYL CITRATE 50 UG/ML
INJECTION, SOLUTION INTRAMUSCULAR; INTRAVENOUS
Status: COMPLETED | OUTPATIENT
Start: 2025-09-03 | End: 2025-09-03

## 2025-09-03 RX ORDER — SODIUM CHLORIDE 9 MG/ML
INJECTION, SOLUTION INTRAVENOUS PRN
Status: DISCONTINUED | OUTPATIENT
Start: 2025-09-03 | End: 2025-09-03 | Stop reason: HOSPADM

## 2025-09-03 RX ORDER — ONDANSETRON 2 MG/ML
INJECTION INTRAMUSCULAR; INTRAVENOUS
Status: DISCONTINUED | OUTPATIENT
Start: 2025-09-03 | End: 2025-09-03 | Stop reason: SDUPTHER

## 2025-09-03 RX ORDER — IPRATROPIUM BROMIDE AND ALBUTEROL SULFATE 2.5; .5 MG/3ML; MG/3ML
1 SOLUTION RESPIRATORY (INHALATION)
Status: CANCELLED | OUTPATIENT
Start: 2025-09-03

## 2025-09-03 RX ORDER — SODIUM CHLORIDE 0.9 % (FLUSH) 0.9 %
5-40 SYRINGE (ML) INJECTION EVERY 12 HOURS SCHEDULED
Status: DISCONTINUED | OUTPATIENT
Start: 2025-09-03 | End: 2025-09-03 | Stop reason: HOSPADM

## 2025-09-03 RX ORDER — GLYCOPYRROLATE 0.2 MG/ML
INJECTION INTRAMUSCULAR; INTRAVENOUS
Status: DISCONTINUED | OUTPATIENT
Start: 2025-09-03 | End: 2025-09-03 | Stop reason: SDUPTHER

## 2025-09-03 RX ORDER — SODIUM CHLORIDE, SODIUM LACTATE, POTASSIUM CHLORIDE, CALCIUM CHLORIDE 600; 310; 30; 20 MG/100ML; MG/100ML; MG/100ML; MG/100ML
INJECTION, SOLUTION INTRAVENOUS CONTINUOUS
Status: DISCONTINUED | OUTPATIENT
Start: 2025-09-03 | End: 2025-09-03 | Stop reason: HOSPADM

## 2025-09-03 RX ORDER — SODIUM CHLORIDE 0.9 % (FLUSH) 0.9 %
5-40 SYRINGE (ML) INJECTION PRN
Status: CANCELLED | OUTPATIENT
Start: 2025-09-03

## 2025-09-03 RX ORDER — SODIUM CHLORIDE 0.9 % (FLUSH) 0.9 %
5-40 SYRINGE (ML) INJECTION EVERY 12 HOURS SCHEDULED
Status: CANCELLED | OUTPATIENT
Start: 2025-09-03

## 2025-09-03 RX ORDER — DEXAMETHASONE SODIUM PHOSPHATE 4 MG/ML
INJECTION, SOLUTION INTRA-ARTICULAR; INTRALESIONAL; INTRAMUSCULAR; INTRAVENOUS; SOFT TISSUE
Status: DISCONTINUED | OUTPATIENT
Start: 2025-09-03 | End: 2025-09-03 | Stop reason: SDUPTHER

## 2025-09-03 RX ADMIN — LIDOCAINE HYDROCHLORIDE 50 MG: 20 INJECTION, SOLUTION EPIDURAL; INFILTRATION; INTRACAUDAL; PERINEURAL at 11:37

## 2025-09-03 RX ADMIN — FENTANYL CITRATE 100 MCG: 50 INJECTION INTRAMUSCULAR; INTRAVENOUS at 10:49

## 2025-09-03 RX ADMIN — SODIUM CHLORIDE, SODIUM LACTATE, POTASSIUM CHLORIDE, AND CALCIUM CHLORIDE: 600; 310; 30; 20 INJECTION, SOLUTION INTRAVENOUS at 12:09

## 2025-09-03 RX ADMIN — MIDAZOLAM 2 MG: 1 INJECTION INTRAMUSCULAR; INTRAVENOUS at 10:49

## 2025-09-03 RX ADMIN — DEXMEDETOMIDINE HYDROCHLORIDE 0.2 ML: 100 INJECTION, SOLUTION INTRAVENOUS at 10:55

## 2025-09-03 RX ADMIN — ONDANSETRON 4 MG: 2 INJECTION, SOLUTION INTRAMUSCULAR; INTRAVENOUS at 11:38

## 2025-09-03 RX ADMIN — PROPOFOL 125 MCG/KG/MIN: 10 INJECTION, EMULSION INTRAVENOUS at 11:20

## 2025-09-03 RX ADMIN — DEXAMETHASONE SODIUM PHOSPHATE 4 MG: 10 INJECTION, SOLUTION INTRAMUSCULAR; INTRAVENOUS at 10:55

## 2025-09-03 RX ADMIN — SODIUM CHLORIDE, SODIUM LACTATE, POTASSIUM CHLORIDE, AND CALCIUM CHLORIDE: 600; 310; 30; 20 INJECTION, SOLUTION INTRAVENOUS at 10:35

## 2025-09-03 RX ADMIN — DEXAMETHASONE SODIUM PHOSPHATE 4 MG: 4 INJECTION INTRA-ARTICULAR; INTRALESIONAL; INTRAMUSCULAR; INTRAVENOUS; SOFT TISSUE at 11:39

## 2025-09-03 RX ADMIN — LIDOCAINE HYDROCHLORIDE 50 MG: 20 INJECTION, SOLUTION EPIDURAL; INFILTRATION; INTRACAUDAL; PERINEURAL at 11:50

## 2025-09-03 RX ADMIN — GLYCOPYRROLATE 0.1 MG: 0.2 INJECTION INTRAMUSCULAR; INTRAVENOUS at 11:41

## 2025-09-03 RX ADMIN — ROPIVACAINE HYDROCHLORIDE 20 ML: 2 INJECTION EPIDURAL; INFILTRATION; PERINEURAL at 10:55

## 2025-09-03 RX ADMIN — Medication 2000 MG: at 11:19

## 2025-09-03 ASSESSMENT — PAIN SCALES - GENERAL
PAINLEVEL_OUTOF10: 0
PAINLEVEL_OUTOF10: 0
PAINLEVEL_OUTOF10: 10
PAINLEVEL_OUTOF10: 0

## 2025-09-03 ASSESSMENT — PAIN DESCRIPTION - FREQUENCY: FREQUENCY: CONTINUOUS

## 2025-09-03 ASSESSMENT — PAIN DESCRIPTION - ORIENTATION: ORIENTATION: RIGHT

## 2025-09-03 ASSESSMENT — PAIN DESCRIPTION - PAIN TYPE: TYPE: CHRONIC PAIN

## 2025-09-03 ASSESSMENT — PAIN DESCRIPTION - LOCATION: LOCATION: SHOULDER

## 2025-09-03 ASSESSMENT — PAIN DESCRIPTION - ONSET: ONSET: ON-GOING

## 2025-09-03 ASSESSMENT — LIFESTYLE VARIABLES: SMOKING_STATUS: 0

## 2025-09-03 ASSESSMENT — PAIN - FUNCTIONAL ASSESSMENT: PAIN_FUNCTIONAL_ASSESSMENT: PREVENTS OR INTERFERES SOME ACTIVE ACTIVITIES AND ADLS

## 2025-09-03 ASSESSMENT — PAIN DESCRIPTION - DESCRIPTORS: DESCRIPTORS: DULL;DISCOMFORT;ACHING

## (undated) DEVICE — POWDER HEMOSTAT GEL 1GR --

## (undated) DEVICE — SYRINGE MED 30ML STD CLR PLAS LUERLOCK TIP N CTRL DISP

## (undated) DEVICE — DRAPE TRNSPAR W51XL47IN PLAS MATTE FINISH U SHP IMPERV

## (undated) DEVICE — Device

## (undated) DEVICE — SUTURE STRATAFIX SPRL SZ 4-0 L12IN ABSRB UD FS-2 L19MM 3/8 SXMP2B409

## (undated) DEVICE — STERILE POLYISOPRENE POWDER-FREE SURGICAL GLOVES: Brand: PROTEXIS

## (undated) DEVICE — Z DISCONTINUED USE (MFG CAT 7984-37) SOLUTION IV SODIUM CHL 0.9% 100 ML INJ

## (undated) DEVICE — GARMENT,MEDLINE,DVT,INT,CALF,MED, GEN2: Brand: MEDLINE

## (undated) DEVICE — NEEDLE HYPO 18GA L1.5IN PNK POLYPR HUB S STL THN WALL FILL

## (undated) DEVICE — SPONGE GZ W4XL4IN COT 12 PLY TYP VII WVN C FLD DSGN STERILE

## (undated) DEVICE — JACKSON TABLE POSITIONER KIT: Brand: MEDLINE INDUSTRIES, INC.

## (undated) DEVICE — GARMENT COMPR M FOR 12-18IN CALF INTMIT SGL BLDR HEMO-FORCE

## (undated) DEVICE — HEAD DONUT FOAM POSITIONER: Brand: CARDINAL HEALTH

## (undated) DEVICE — SUTURE STRATAFIX SZ 3-0 L30CM NONABSORBABLE UD L19MM FS-2 SXMP2B408

## (undated) DEVICE — GOWN,REINFORCED,POLY,XLARGE: Brand: MEDLINE

## (undated) DEVICE — TOTAL TRAY, DB, 100% SILI FOLEY, 16FR 10: Brand: MEDLINE

## (undated) DEVICE — BLADE SHV DIA4.5MM VIO PLAT STR SHFT LEAST AGG LATCH DISP

## (undated) DEVICE — PAD ABD W5XL9IN GEN USE NONADHESIVE EXTRA ABSRB SFT

## (undated) DEVICE — STERILE POLYISOPRENE POWDER-FREE SURGICAL GLOVES WITH EMOLLIENT COATING: Brand: PROTEXIS

## (undated) DEVICE — PREP SKN CHLRAPRP APL 26ML STR --

## (undated) DEVICE — KIT EVAC 0.13IN RECT TB DIA10FR 400CC PVC 3 SPR Y CONN DRN

## (undated) DEVICE — SOLUTION IRRIGATION LR 3000 ML USP TITAN XL CONTAINER 4/CA

## (undated) DEVICE — DRAPE SURG W54XL76IN STD POLYPR IMPERV U DISP

## (undated) DEVICE — TUBING PMP L8FT LNG W/ CONN FOR AR-6400 REDEUCE

## (undated) DEVICE — DERMABOND SKIN ADH 0.7ML --

## (undated) DEVICE — PACK PROCEDURE SURG ANTR CERV DISCECTOMY

## (undated) DEVICE — 3.0MM PRECISION NEURO (MATCH HEAD)

## (undated) DEVICE — APPLICATOR MEDICATED 26 CC SOLUTION HI LT ORNG CHLORAPREP

## (undated) DEVICE — SUTURE MONOCRYL SZ 3-0 L27IN ABSRB UD PS-2 3/8 CIR REV CUT NDL MCP427H

## (undated) DEVICE — SYRINGE IRRIG 60ML SFT PLIABLE BLB EZ TO GRP 1 HND USE W/

## (undated) DEVICE — CORD BPLR L12FT DISP

## (undated) DEVICE — NEEDLE HYPO 25GA L1.5IN BVL ORIENTED ECLIPSE

## (undated) DEVICE — SUTURE VCRL + SZ 2-0 L18IN ABSRB VLT CT-2 1/2 CIR TAPERCUT VCP726D

## (undated) DEVICE — MASTISOL ADHESIVE LIQ 2/3ML

## (undated) DEVICE — GAUZE,SPONGE,8"X4",12PLY,XRAY,STRL,LF: Brand: MEDLINE

## (undated) DEVICE — TUBE IRRIG L8IN LNG PT W/ CONN FOR PMP SYS REDEUCE

## (undated) DEVICE — 1010 S-DRAPE TOWEL DRAPE 10/BX: Brand: STERI-DRAPE™

## (undated) DEVICE — SOL IRRIGATION INJ NACL 0.9% 500ML BTL

## (undated) DEVICE — STRIP SKIN CLSR W0.5XL4IN WHT SPUNBOUND FBR NYL STERIL HI ADH

## (undated) DEVICE — DRAPE SHLDR W104XL159IN STD POLYPR SURG BNCH CHR ABSRB

## (undated) DEVICE — PACK PROCEDURE SURG LAMINECTOMY SPINE CUST

## (undated) DEVICE — CATH IV AUTOGRD ORN 14GA 45MM -- INSYTE-N

## (undated) DEVICE — SYR 3ML LL TIP 1/10ML GRAD --

## (undated) DEVICE — CANNULA ARTHSCP L72MM OD8MM LIME GRN FLX THRD OBT 3 SEAL

## (undated) DEVICE — BOWL ASSY BM210 DUAL BLADE DISPOSABLE: Brand: MIDAS REX™

## (undated) DEVICE — SOLUTION IRRIG 3000ML LAC R FLX CONT

## (undated) DEVICE — GLOVE SURG SZ 85 L12IN THK75MIL DK GRN LTX FREE

## (undated) DEVICE — SYR LR LCK 1ML GRAD NSAF 30ML --

## (undated) DEVICE — SUTURE VCRL SZ 2-0 L18IN ABSRB VLT L26MM CT-2 1/2 CIR J726D

## (undated) DEVICE — STERILE MEDICINE CUP 2 OZ KIT: Brand: CARDINAL HEALTH

## (undated) DEVICE — SPONGE GZ W4XL4IN COT 12 PLY TYP VII WVN C FLD DSGN

## (undated) DEVICE — OPTIFOAM GENTLE SA, POSTOP, 4X8: Brand: MEDLINE

## (undated) DEVICE — SUTURE VCRL + SZ 1 L18IN ABSRB UD L36MM CT-1 1/2 CIR VCP841D

## (undated) DEVICE — SYR 5ML 1/5 GRAD LL NSAF LF --

## (undated) DEVICE — DECANTING SET

## (undated) DEVICE — 5.0MM X-COARSE DIAMOND

## (undated) DEVICE — HANDPIECE SET WITH HIGH FLOW TIP AND SUCTION TUBE: Brand: INTERPULSE